# Patient Record
Sex: FEMALE | Race: WHITE | HISPANIC OR LATINO | Employment: UNEMPLOYED | URBAN - METROPOLITAN AREA
[De-identification: names, ages, dates, MRNs, and addresses within clinical notes are randomized per-mention and may not be internally consistent; named-entity substitution may affect disease eponyms.]

---

## 2020-03-02 ENCOUNTER — INITIAL PRENATAL (OUTPATIENT)
Dept: OBGYN | Facility: CLINIC | Age: 20
End: 2020-03-02

## 2020-03-02 VITALS
WEIGHT: 142 LBS | BODY MASS INDEX: 26.81 KG/M2 | SYSTOLIC BLOOD PRESSURE: 114 MMHG | HEIGHT: 61 IN | DIASTOLIC BLOOD PRESSURE: 66 MMHG

## 2020-03-02 DIAGNOSIS — N93.8 DUB (DYSFUNCTIONAL UTERINE BLEEDING): ICD-10-CM

## 2020-03-02 DIAGNOSIS — Z32.01 POSITIVE PREGNANCY TEST: ICD-10-CM

## 2020-03-02 LAB
INT CON NEG: NEGATIVE
INT CON POS: POSITIVE
POC URINE PREGNANCY TEST: POSITIVE

## 2020-03-02 PROCEDURE — 81025 URINE PREGNANCY TEST: CPT | Performed by: OBSTETRICS & GYNECOLOGY

## 2020-03-02 PROCEDURE — 99203 OFFICE O/P NEW LOW 30 MIN: CPT | Mod: 25 | Performed by: OBSTETRICS & GYNECOLOGY

## 2020-03-02 PROCEDURE — 76830 TRANSVAGINAL US NON-OB: CPT | Performed by: OBSTETRICS & GYNECOLOGY

## 2020-03-02 SDOH — HEALTH STABILITY: MENTAL HEALTH: HOW OFTEN DO YOU HAVE A DRINK CONTAINING ALCOHOL?: NEVER

## 2020-03-02 NOTE — PROGRESS NOTES
"Subjective:      Gloria Smith is a 19 y.o. female who presents with amenorrhea and positive home pregnancy test            HPI patient is a 19-year-old -0-0-0 who presents today with amenorrhea and positive home pregnancy test.  By last menstrual period,  Patient is supposed to be 15 weeks and 3 days gestation.  Pregnancy was desired and patient was not using contraception.  She is taking prenatal vitamins.  Reports some mild nausea without emesis.  Denies any pain or bleeding.  Reports normal bowel and bladder functions.    Patient reports 1 vaginal delivery in [place] in 2018 and states baby  about 10 days after birth from some form of bowel infection    ROS all organ systems were reviewed and were negative except for complaints in HPI       Objective:     /66   Ht 1.54 m (5' 0.63\")   Wt 64.4 kg (142 lb)   LMP 11/15/2019   BMI 27.16 kg/m²      Physical Exam  Vitals signs and nursing note reviewed. Exam conducted with a chaperone present.   Constitutional:       Appearance: Normal appearance. She is normal weight.   HENT:      Head: Normocephalic and atraumatic.   Eyes:      General: No scleral icterus.        Right eye: No discharge.         Left eye: No discharge.      Extraocular Movements: Extraocular movements intact.      Pupils: Pupils are equal, round, and reactive to light.   Neck:      Musculoskeletal: Normal range of motion and neck supple. No neck rigidity.   Cardiovascular:      Rate and Rhythm: Normal rate and regular rhythm.      Pulses: Normal pulses.      Heart sounds: Normal heart sounds.   Pulmonary:      Effort: Pulmonary effort is normal. No respiratory distress.      Breath sounds: Normal breath sounds. No wheezing.   Abdominal:      General: Abdomen is flat. Bowel sounds are normal.      Palpations: Abdomen is soft.      Tenderness: There is no abdominal tenderness.   Genitourinary:     General: Normal vulva.      Vagina: No vaginal discharge. "   Musculoskeletal: Normal range of motion.      Right lower leg: No edema.      Left lower leg: No edema.   Lymphadenopathy:      Cervical: No cervical adenopathy.   Skin:     General: Skin is warm and dry.      Coloration: Skin is not jaundiced.      Findings: No bruising.   Neurological:      General: No focal deficit present.      Mental Status: She is alert and oriented to person, place, and time.      Gait: Gait normal.   Psychiatric:         Mood and Affect: Mood normal.         Behavior: Behavior normal.         Thought Content: Thought content normal.         Judgment: Judgment normal.            Transabdominal and transvaginal ultrasound was performed and read by me:  Patient reports she is about 15 weeks pregnant but on transabdominal ultrasound, limited visualization of the uterus was obtained and transvaginal ultrasound was then performed.    Brar intrauterine pregnancy noted  Fetal heart rate was in the 170s  Crown-rump length measurement gives a gestational age of 8 weeks and 3 days  EDC by CRL is 10/9/2020  Gestational age by LMP is 15 weeks and 3 days with EDC of 8/21/2020  No adnexal masses noted  No pelvic free fluid noted    Impression: Normal intrauterine pregnancy at 8 weeks and 3 days gestation with final EDC of 10/9/2020     Assessment/Plan:       1.  Secondary amenorrhea  19-year-old G2, P0 presented with amenorrhea and positive home pregnancy test.  Normal intrauterine pregnancy confirmed today at 8 weeks and 3 days gestation.  Findings reviewed  - POCT Pregnancy  Pregnancy precautions and restrictions reviewed    2. Positive pregnancy test    3.  Nausea in pregnancy.  Treatment options discussed including dietary modification and over-the-counter treatment.  Instructions provided    4.  Precautions and plan of care reviewed.  Patient to follow-up around 10 weeks gestation for new OB exam

## 2020-03-02 NOTE — PROGRESS NOTES
Pt. Here for  visit today.  # [phone]  Pt. States no complaints   Pharmacy verified  Chaperone offered and not indicated

## 2020-03-04 ENCOUNTER — APPOINTMENT (OUTPATIENT)
Dept: OBGYN | Facility: CLINIC | Age: 20
End: 2020-03-04

## 2020-03-27 ENCOUNTER — HOSPITAL ENCOUNTER (OUTPATIENT)
Dept: LAB | Facility: MEDICAL CENTER | Age: 20
End: 2020-03-27
Attending: ADVANCED PRACTICE MIDWIFE
Payer: COMMERCIAL

## 2020-03-27 ENCOUNTER — HOSPITAL ENCOUNTER (OUTPATIENT)
Facility: MEDICAL CENTER | Age: 20
End: 2020-03-27
Attending: ADVANCED PRACTICE MIDWIFE
Payer: COMMERCIAL

## 2020-03-27 ENCOUNTER — INITIAL PRENATAL (OUTPATIENT)
Dept: OBGYN | Facility: CLINIC | Age: 20
End: 2020-03-27

## 2020-03-27 VITALS
HEART RATE: 68 BPM | WEIGHT: 142 LBS | BODY MASS INDEX: 27.16 KG/M2 | SYSTOLIC BLOOD PRESSURE: 106 MMHG | DIASTOLIC BLOOD PRESSURE: 60 MMHG

## 2020-03-27 DIAGNOSIS — Z34.81 ENCOUNTER FOR SUPERVISION OF OTHER NORMAL PREGNANCY IN FIRST TRIMESTER: ICD-10-CM

## 2020-03-27 LAB
ABO GROUP BLD: NORMAL
AMORPH CRY #/AREA URNS HPF: PRESENT /HPF
APPEARANCE UR: ABNORMAL
BACTERIA #/AREA URNS HPF: ABNORMAL /HPF
BASOPHILS # BLD AUTO: 0.5 % (ref 0–1.8)
BASOPHILS # BLD: 0.05 K/UL (ref 0–0.12)
BILIRUB UR QL STRIP.AUTO: NEGATIVE
BLD GP AB SCN SERPL QL: NORMAL
COLOR UR: YELLOW
EOSINOPHIL # BLD AUTO: 0.07 K/UL (ref 0–0.51)
EOSINOPHIL NFR BLD: 0.7 % (ref 0–6.9)
EPI CELLS #/AREA URNS HPF: NEGATIVE /HPF
ERYTHROCYTE [DISTWIDTH] IN BLOOD BY AUTOMATED COUNT: 43.6 FL (ref 35.9–50)
GLUCOSE UR STRIP.AUTO-MCNC: NEGATIVE MG/DL
HBV SURFACE AG SER QL: ABNORMAL
HCT VFR BLD AUTO: 40.5 % (ref 37–47)
HCV AB SER QL: NORMAL
HGB BLD-MCNC: 13.9 G/DL (ref 12–16)
HIV 1+2 AB+HIV1 P24 AG SERPL QL IA: NORMAL
HYALINE CASTS #/AREA URNS LPF: ABNORMAL /LPF
IMM GRANULOCYTES # BLD AUTO: 0.06 K/UL (ref 0–0.11)
IMM GRANULOCYTES NFR BLD AUTO: 0.6 % (ref 0–0.9)
KETONES UR STRIP.AUTO-MCNC: NEGATIVE MG/DL
LEUKOCYTE ESTERASE UR QL STRIP.AUTO: NEGATIVE
LYMPHOCYTES # BLD AUTO: 1.81 K/UL (ref 1–4.8)
LYMPHOCYTES NFR BLD: 17.7 % (ref 22–41)
MCH RBC QN AUTO: 32.3 PG (ref 27–33)
MCHC RBC AUTO-ENTMCNC: 34.3 G/DL (ref 33.6–35)
MCV RBC AUTO: 94.2 FL (ref 81.4–97.8)
MICRO URNS: ABNORMAL
MONOCYTES # BLD AUTO: 0.74 K/UL (ref 0–0.85)
MONOCYTES NFR BLD AUTO: 7.2 % (ref 0–13.4)
NEUTROPHILS # BLD AUTO: 7.5 K/UL (ref 2–7.15)
NEUTROPHILS NFR BLD: 73.3 % (ref 44–72)
NITRITE UR QL STRIP.AUTO: NEGATIVE
NRBC # BLD AUTO: 0 K/UL
NRBC BLD-RTO: 0 /100 WBC
PH UR STRIP.AUTO: 8 [PH] (ref 5–8)
PLATELET # BLD AUTO: 187 K/UL (ref 164–446)
PMV BLD AUTO: 10.2 FL (ref 9–12.9)
PROT UR QL STRIP: NEGATIVE MG/DL
RBC # BLD AUTO: 4.3 M/UL (ref 4.2–5.4)
RBC # URNS HPF: ABNORMAL /HPF
RBC UR QL AUTO: NEGATIVE
RH BLD: NORMAL
RUBV AB SER QL: 254 IU/ML
SP GR UR STRIP.AUTO: 1.02
TREPONEMA PALLIDUM IGG+IGM AB [PRESENCE] IN SERUM OR PLASMA BY IMMUNOASSAY: ABNORMAL
UROBILINOGEN UR STRIP.AUTO-MCNC: 0.2 MG/DL
WBC # BLD AUTO: 10.2 K/UL (ref 4.8–10.8)
WBC #/AREA URNS HPF: ABNORMAL /HPF

## 2020-03-27 PROCEDURE — 8199 PREG CTR HIGH RISK PKG RATE (SYSTEM): Performed by: ADVANCED PRACTICE MIDWIFE

## 2020-03-27 PROCEDURE — 59402 PR NEW OB HIGH RISK: CPT | Performed by: ADVANCED PRACTICE MIDWIFE

## 2020-03-27 NOTE — LETTER
Estudios Para Detectar los Portadores de la Fibrosis Quística   (La Enfermedad Fibroquística del Páncreas)    Gloria Sequeirato Luis    Esta información esta relacionada con un examen de jazmin que puede determinar si usted o win theresa es portador del gen que causa la enfermedad hereditaria que se llama la fibrosis quística.     ¿QUE ES LA ENFERMEDAD FIBROSIS QUÍSTICA?  · La  fibrosis quística es leno enfermedad hereditaria que afecta a más de 25,000 niños y jóvenes adultos americanos.  · Los síntomas de la fibrosis quística varían de leno persona a otra.  Los síntomas más comunes son congestión pulmonar, diarrea y retraso en el crecimiento del jennifer.  La mayoría de las personas con la fibrosis quística padecen de problemas médicos muy severos, y algunas mueren jóvenes.  Otras tienen tan pocos síntomas que no se percatan de que tienen fibrosis quística.  · La fibrosis quística no afecta en absoluto la inteligencia de la persona.  · Aunque actualmente no hay leno francisco j para la fibrosis quística, los científicos están avanzando con respecto a nuevos tratamientos y en la búsqueda de la francisco j.  Anteriormente la mayoría de las personas que padecían de fibrosis quística morían muy jóvenes.  Hoy en día, muchas personas que padecen de la fibrosis quística sobreviven hasta los 20 o 30 anos de edad.    ¿EXISTE LA POSIBILIDAD DE QUE MI LAQUITA PUEDA TENER FIBROSIS QUÍSTICA?  · Usted puede tener un hijo con la fibrosis quística aun cuando no hay nadie en win irving que la padezca.  (Ines la grafica que sigue.)  · Existe leno prueba que puede ayudarle a determinar si will un gen para la fibrosis quística y si por eso corre un riesgo de tener un hijo con la enfermedad.  · Si ambos padres son portadores del gen para la fibrosis quística, hay leno (1) probabilidad entre 4 (25%) en cada embarazo, de que puedan tener un hijo afectada con fibrosis quística.  · Los portadores del gen para la fibrosis quística tienen leno copia del  gen normal y el otro gen alterado.  · Las personas con fibrosis quística tienen dos genes alterados para la fibrosis quística,  · Normalmente la mayoría de individuos tienen dos copias normales del gen para fibrosis quística.    Riesgo aproximado de que leno theresa sin familiares con fibrosis quística pueda tener un hijo con fibrosis quística:  Origen / Riesgo  Leno theresa Caucásica (de karoline manju):  1 en 2,500  Leno theresa :  1 en 8,000  Leno theresa Afroamericana (de karoline sharad):  1 en 15,000  Leno theresa Asiática:  1 en 32,000    ¿EXISTEN PRUEBAS PARA DETECTAR LOS PORTADORES DE LA FIBROSIS QUÍSTICA?  · Hay leno prueba de jazmin para determinar si usted o win theresa portan el gen para la fibrosis quística.  · Es importante entender que la prueba no detecta todos los portadores del gen para la fibrosis quística.  · Si la prueba determina que ambos padres con portadores, se puede realizar otras pruebas para determinar si win futuro vero esta afectado.    ¿CUANTO DIANNA LA PRUEBA PARA DETERMINAR SI SOY PORTADOR(A) DEL GEN PARA LA FIBROSIS QUÍSTICA?  · El costo de la prueba varia según win póliza de seguro medico y el laboratorio donde se efectúa el análisis.  · El costo promedio de la prueba es de $300.  · Win consejera genética puede darle mas información acera de esta prueba para determinar si usted es portador de la fibrosis quística.    _____  Si, yo estoy interesada y me gustaria obtener mas información al respecto.  _____  No tengo interés ni en hacerme la prueba para determinar si soy portador(a) de gen para la fibrosis quística ni en recibir mas información al respecto.    Firma del paciente: _____________________________   3/27/2020

## 2020-03-27 NOTE — PROGRESS NOTES
Subjective:   Gloria Smith is a 19 y.o.  who presents for her new OB exam.  She is 12w0d with an ASTRID of Estimated Date of Delivery: 10/9/20 by US. She is feeling well and has no concerns at this time. Denies VB, LOF, contractions or pain. She reports no ER visits or previous care in this pregnancy. Denies dysuria, vaginal DC, fever. Reports absent fetal movement. Unsure AFP.  Declines CF.      History reviewed. No pertinent past medical history.    Psych Hx: Patient denies any history of depression, anxiety, PTSD, bipolar or any other psychological issues.     History reviewed. No pertinent surgical history.     OB History    Para Term  AB Living   2 1 1         SAB TAB Ectopic Molar Multiple Live Births             1      # Outcome Date GA Lbr Suresh/2nd Weight Sex Delivery Anes PTL Lv   2 Current            1 Term 18 40w0d   M Vag-Spont EPI N DEC      Birth Comments:  at 10 days old; NEC?        Gynecological Hx: Denies any hx of STIs, including HSV. Denies any vulvovaginal disorders and no hx of abnormal cervical cytology. Last pap . No records    Sexual Hx: One current male partner, who is  FOB     Family History   Problem Relation Age of Onset   • No Known Problems Mother    • Diabetes Father    • No Known Problems Sister    • No Known Problems Brother    • No Known Problems Sister      Denies any genetic disorders in family history.     Social History     Socioeconomic History   • Marital status: Single     Spouse name: Not on file   • Number of children: Not on file   • Years of education: Not on file   • Highest education level: Not on file   Occupational History   • Not on file   Social Needs   • Financial resource strain: Not on file   • Food insecurity     Worry: Not on file     Inability: Not on file   • Transportation needs     Medical: Not on file     Non-medical: Not on file   Tobacco Use   • Smoking status: Never Smoker   • Smokeless tobacco: Never  Used   Substance and Sexual Activity   • Alcohol use: Never     Frequency: Never   • Drug use: Never   • Sexual activity: Yes     Partners: Male     Comment: not . Planned pregnancy   Lifestyle   • Physical activity     Days per week: Not on file     Minutes per session: Not on file   • Stress: Not on file   Relationships   • Social connections     Talks on phone: Not on file     Gets together: Not on file     Attends Taoism service: Not on file     Active member of club or organization: Not on file     Attends meetings of clubs or organizations: Not on file     Relationship status: Not on file   • Intimate partner violence     Fear of current or ex partner: Not on file     Emotionally abused: Not on file     Physically abused: Not on file     Forced sexual activity: Not on file   Other Topics Concern   • Not on file   Social History Narrative   • Not on file       FOB (Duran) and lives with Gloria Smith.  Pregnancy is  planned is desired.    She is currently not working  , denies any heavy lifting or exposure to potential teratogens like environmental or occupational toxins.   Denies alcohol use, drug use, or tobacco use in pregnancy.   Denies any current or hx of sexual, emotional or physical abuse or trauma.     Current Medications: PNV  Allergies: Denies allergies to medications, food, or environmental allergies    Objective:      Vitals:    03/27/20 0755   BP: 106/60   Pulse: 68   Weight: 64.4 kg (142 lb)        See Prenatal Physical and Prenatal Vitals  UA WNL today      Assessment:      1.  IUP @ 12w0d per US      2.  S=D      3.  See problem list as follows     There are no active problems to display for this patient.        Plan:   -  GC/CT & pap done today   - Prenatal labs ordered - lab slip provided  - Discussed PNV, nutrition, adequate water intake, and exercise/weight gain in pregnancy  - NOB informational packet with anticipatory guidance given  - Reviewed Centering  Pregnancy and patient is not candidate.   - S/sx of pregnancy warning signs and PTL precautions given  - Complete OB US in 8 wks  - Return to clinic in 4 weeks.

## 2020-03-27 NOTE — PROGRESS NOTES
NOB visit  LMP: Unknown   WT: 142 lb  BP: 106/60  Pt states no complaints or concerns today  Good # [phone]

## 2020-03-28 LAB
AMPHET CTO UR CFM-MCNC: NEGATIVE NG/ML
BARBITURATES CTO UR CFM-MCNC: NEGATIVE NG/ML
BENZODIAZ CTO UR CFM-MCNC: NEGATIVE NG/ML
CANNABINOIDS CTO UR CFM-MCNC: NEGATIVE NG/ML
COCAINE CTO UR CFM-MCNC: NEGATIVE NG/ML
DRUG COMMENT 753798: NORMAL
METHADONE CTO UR CFM-MCNC: NEGATIVE NG/ML
OPIATES CTO UR CFM-MCNC: NEGATIVE NG/ML
PCP CTO UR CFM-MCNC: NEGATIVE NG/ML
PROPOXYPH CTO UR CFM-MCNC: NEGATIVE NG/ML

## 2020-04-01 PROBLEM — R87.612 PAPANICOLAOU SMEAR OF CERVIX WITH LOW GRADE SQUAMOUS INTRAEPITHELIAL LESION (LGSIL): Status: ACTIVE | Noted: 2020-04-01

## 2020-04-01 LAB
C TRACH DNA GENITAL QL NAA+PROBE: NEGATIVE
CYTOLOGY REG CYTOL: NORMAL
N GONORRHOEA DNA GENITAL QL NAA+PROBE: NEGATIVE
SPECIMEN SOURCE: NORMAL

## 2020-04-24 ENCOUNTER — ROUTINE PRENATAL (OUTPATIENT)
Dept: OBGYN | Facility: CLINIC | Age: 20
End: 2020-04-24

## 2020-04-24 VITALS — SYSTOLIC BLOOD PRESSURE: 122 MMHG | WEIGHT: 145 LBS | DIASTOLIC BLOOD PRESSURE: 74 MMHG | BODY MASS INDEX: 27.73 KG/M2

## 2020-04-24 DIAGNOSIS — Z87.59 HISTORY OF NEONATAL DEATH: ICD-10-CM

## 2020-04-24 PROCEDURE — 90040 PR PRENATAL FOLLOW UP: CPT | Performed by: NURSE PRACTITIONER

## 2020-04-24 NOTE — PROGRESS NOTES
SUBJECTIVE:  Pt is a 19 y.o.   at 16w0d  gestation. Presents today for follow-up prenatal care. Reports no issues at this time.  Reports no fetal movement. Denies regular cramping/contractions, bleeding or leaking of fluid. Denies dysuria, headaches, N/V. Generally feels well today.       OBJECTIVE:  - See prenatal vitals flow  -   Vitals:    20 0806   BP: 122/74   Weight: 65.8 kg (145 lb)                 ASSESSMENT:   - IUP at 16w0d    - S=D   - BSUS shows positive fetal movement, intrauterine pregnancy, FHTs 150s  Patient Active Problem List    Diagnosis Date Noted   • History of  death 2020   • Papanicolaou smear of cervix with low grade squamous intraepithelial lesion (LGSIL) 2020         PLAN:  - S/sx pregnancy and labor warning signs vs general discomforts discussed  - Fetal movements and/or kick counts reviewed   - Adequate hydration reinforced  - Nutrition/exercise/vitamin education; continue PNV  - Declines AFP  - US scheduled  - Reviewed need for repap in one year due to LGSIL  - Anticipatory guidance given  - RTC in 6 weeks for follow-up prenatal care

## 2020-04-24 NOTE — PROGRESS NOTES
Pt here today for OB follow up  Reports no issues at this moment  Reports - FM  Good # [phone]  Pharmacy Confirmed. W/ pt  Declines AFP    05/22/20

## 2020-05-22 ENCOUNTER — APPOINTMENT (OUTPATIENT)
Dept: RADIOLOGY | Facility: IMAGING CENTER | Age: 20
End: 2020-05-22
Attending: ADVANCED PRACTICE MIDWIFE

## 2020-05-22 DIAGNOSIS — Z34.81 ENCOUNTER FOR SUPERVISION OF OTHER NORMAL PREGNANCY IN FIRST TRIMESTER: ICD-10-CM

## 2020-05-22 PROCEDURE — 76805 OB US >/= 14 WKS SNGL FETUS: CPT | Mod: TC | Performed by: OBSTETRICS & GYNECOLOGY

## 2020-06-05 ENCOUNTER — ROUTINE PRENATAL (OUTPATIENT)
Dept: OBGYN | Facility: CLINIC | Age: 20
End: 2020-06-05

## 2020-06-05 VITALS — BODY MASS INDEX: 28.38 KG/M2 | WEIGHT: 148.4 LBS | DIASTOLIC BLOOD PRESSURE: 64 MMHG | SYSTOLIC BLOOD PRESSURE: 116 MMHG

## 2020-06-05 DIAGNOSIS — O09.899 SUPERVISION OF OTHER HIGH RISK PREGNANCY, ANTEPARTUM: Primary | ICD-10-CM

## 2020-06-05 PROCEDURE — 90040 PR PRENATAL FOLLOW UP: CPT | Performed by: NURSE PRACTITIONER

## 2020-06-05 NOTE — PROGRESS NOTES
Pt. Here for OB/FU. Reports Good FM.   Good # [phone]  Pt. Denies VB, LOF, or UC's.   Pharmacy verified.   Chaperone offered and not indicated  Pt states having some discharge, it is sometimes white, denies an odor

## 2020-06-05 NOTE — PROGRESS NOTES
S) Pt is a 19 y.o.   at 22w0d  gestation. Routine prenatal care today. No complaints at this time. Reviewed lab and US results. All questions answered.  labor precautions reviewed.    Fetal movement Normal  Cramping no  VB no  LOF no   Denies dysuria. Generally feels well today. Good self-care activities identified. Denies headaches, swelling, visual changes, or epigastric pain .     O) /64   Wt 67.3 kg (148 lb 6.4 oz)         Labs:       PNL: WNL       GCT: Too early        AFP: Not Examined       GBS: N/A       Pertinent ultrasound -        20- Survey WNL, BENSON 13.12cm, c/w prev dating.    A) IUP at 22w0d       S=D         Patient Active Problem List    Diagnosis Date Noted   • Supervision of other high risk pregnancy, antepartum 2020   • History of  death 2020   • Papanicolaou smear of cervix with low grade squamous intraepithelial lesion (LGSIL) 2020          SVE: deferred       Chaperone offered: n/a         TDAP: no       FLU: no        BTL: no       : n/a       C/S Consent: n/a       IOL or C/S scheduled: no       LAST PAP: 3/27/20- LGSIL- needs repeat in 1 year         P) s/s ptl vs general discomforts. Fetal movements reviewed. General ed and anticipatory guidance. Nutrition/exercise/vitamin. Plans breast Plans pp contraception- unsure  Continue PNV.

## 2020-07-02 ENCOUNTER — HOSPITAL ENCOUNTER (OUTPATIENT)
Dept: LAB | Facility: MEDICAL CENTER | Age: 20
End: 2020-07-02
Attending: NURSE PRACTITIONER
Payer: COMMERCIAL

## 2020-07-02 ENCOUNTER — ROUTINE PRENATAL (OUTPATIENT)
Dept: OBGYN | Facility: CLINIC | Age: 20
End: 2020-07-02

## 2020-07-02 VITALS — WEIGHT: 154 LBS | SYSTOLIC BLOOD PRESSURE: 102 MMHG | BODY MASS INDEX: 29.45 KG/M2 | DIASTOLIC BLOOD PRESSURE: 64 MMHG

## 2020-07-02 DIAGNOSIS — O09.899 SUPERVISION OF OTHER HIGH RISK PREGNANCY, ANTEPARTUM: Primary | ICD-10-CM

## 2020-07-02 DIAGNOSIS — O09.899 SUPERVISION OF OTHER HIGH RISK PREGNANCY, ANTEPARTUM: ICD-10-CM

## 2020-07-02 LAB
BASOPHILS # BLD AUTO: 0.5 % (ref 0–1.8)
BASOPHILS # BLD: 0.06 K/UL (ref 0–0.12)
EOSINOPHIL # BLD AUTO: 0.05 K/UL (ref 0–0.51)
EOSINOPHIL NFR BLD: 0.4 % (ref 0–6.9)
ERYTHROCYTE [DISTWIDTH] IN BLOOD BY AUTOMATED COUNT: 49.2 FL (ref 35.9–50)
GLUCOSE 1H P 50 G GLC PO SERPL-MCNC: 186 MG/DL (ref 70–139)
HCT VFR BLD AUTO: 40.5 % (ref 37–47)
HGB BLD-MCNC: 13.4 G/DL (ref 12–16)
IMM GRANULOCYTES # BLD AUTO: 0.19 K/UL (ref 0–0.11)
IMM GRANULOCYTES NFR BLD AUTO: 1.7 % (ref 0–0.9)
LYMPHOCYTES # BLD AUTO: 1.65 K/UL (ref 1–4.8)
LYMPHOCYTES NFR BLD: 14.4 % (ref 22–41)
MCH RBC QN AUTO: 32.6 PG (ref 27–33)
MCHC RBC AUTO-ENTMCNC: 33.1 G/DL (ref 33.6–35)
MCV RBC AUTO: 98.5 FL (ref 81.4–97.8)
MONOCYTES # BLD AUTO: 0.59 K/UL (ref 0–0.85)
MONOCYTES NFR BLD AUTO: 5.1 % (ref 0–13.4)
NEUTROPHILS # BLD AUTO: 8.95 K/UL (ref 2–7.15)
NEUTROPHILS NFR BLD: 77.9 % (ref 44–72)
NRBC # BLD AUTO: 0 K/UL
NRBC BLD-RTO: 0 /100 WBC
PLATELET # BLD AUTO: 175 K/UL (ref 164–446)
PMV BLD AUTO: 10.1 FL (ref 9–12.9)
RBC # BLD AUTO: 4.11 M/UL (ref 4.2–5.4)
TREPONEMA PALLIDUM IGG+IGM AB [PRESENCE] IN SERUM OR PLASMA BY IMMUNOASSAY: NORMAL
WBC # BLD AUTO: 11.5 K/UL (ref 4.8–10.8)

## 2020-07-02 PROCEDURE — 90040 PR PRENATAL FOLLOW UP: CPT | Performed by: NURSE PRACTITIONER

## 2020-07-02 NOTE — PROGRESS NOTES
S) Pt is a 19 y.o.   at 25w6d  gestation. Routine prenatal care today. Feeling well today. Reports feeling the baby move. Denies LOF, Bleeding, and cramping. Drinking 3 L of water/ day and getting adequate nutrition. Getting 6-7 hours of sleep/night.  Denies headaches, swelling, visual changes, or epigastric pain .     O) See flow sheet for vital signs and fetal measurements.          Labs:       PNL: WNL       GCT: ordered today        AFP: Not Examined       GBS: N/A       Pertinent ultrasound - 20- Anatomy  WNL,, c/w prev dating            A) IUP at 25w6d       S=D         Patient Active Problem List    Diagnosis Date Noted   • Supervision of other high risk pregnancy, antepartum 2020   • History of  death 2020   • Papanicolaou smear of cervix with low grade squamous intraepithelial lesion (LGSIL) 2020          SVE: Not indicated           TDAP: no       FLU: no        BTL: no       : no       C/S Consent: no       IOL or C/S scheduled: no       LAST PAP: 2020- LSIL - repeat 1 year          P)   [id number] used   Req given with instructions for GCT, CBC, & RPR   s/s ptl vs general discomforts.   Fetal movements reviewed.   General ed and anticipatory guidance.   Nutrition/exercise/vitamin  Continue PNV.   RTO 3 weeks with GCT done

## 2020-07-04 DIAGNOSIS — R73.09 ELEVATED GLUCOSE TOLERANCE TEST: ICD-10-CM

## 2020-07-23 ENCOUNTER — ROUTINE PRENATAL (OUTPATIENT)
Dept: OBGYN | Facility: CLINIC | Age: 20
End: 2020-07-23

## 2020-07-23 VITALS — BODY MASS INDEX: 30.14 KG/M2 | DIASTOLIC BLOOD PRESSURE: 70 MMHG | SYSTOLIC BLOOD PRESSURE: 110 MMHG | WEIGHT: 157.6 LBS

## 2020-07-23 DIAGNOSIS — O09.899 SUPERVISION OF OTHER HIGH RISK PREGNANCY, ANTEPARTUM: ICD-10-CM

## 2020-07-23 PROCEDURE — 90715 TDAP VACCINE 7 YRS/> IM: CPT | Performed by: PHYSICIAN ASSISTANT

## 2020-07-23 PROCEDURE — 90471 IMMUNIZATION ADMIN: CPT | Performed by: PHYSICIAN ASSISTANT

## 2020-07-23 PROCEDURE — 90040 PR PRENATAL FOLLOW UP: CPT | Performed by: PHYSICIAN ASSISTANT

## 2020-07-23 NOTE — PROGRESS NOTES
Pt has no complaints with cramping, UCs, VB, LOF, though pt has had incr pressure and occ incr vag d/c only. Pt denies vag itching, burning or odor. +FM - FKC sheet given today. 1hr  - pt notified of results and need for 3hr gTT asap. CBC, T pallidium wnl - pt notified of results. Declines BTL if C/S needed. TDap given today. PTL precautions stressed. Unable to hear FHT, so BSUS done today with breech position noted, FHT 137bpm, +FM. RTC 2 wk ro sooner prn.

## 2020-07-23 NOTE — LETTER
Cuente los Movimientos de win Bebé  Otro paso importante para la marco a de win bebé    Gloria Cuellogail Rigo Smith     [facility]            Dept: [phone]    ¿Cuántas semanas tiene de embarazo? 28w6d    Fecha cuando tiene que comenzar a contar el movimiento: 07/23/2020                  Lo debe usar tyson diagrama    Leno manera en que win doctor puede controlar a marco a de win bebé es sabiendo cuantas veces se mueve win bebé en el útero, o por medio de las “pataditas”.  Usted podrá ayudarle a win médico al usar cada día el siguiente diagrama.    Cada día, usted debe prestar atención a cuantas horas le lleva a win bebé moverse 10 veces.  Comience a contar en la mañana, lo antes posible después de haberse levantado.    · Primeramente, escriba la hora en que se mueve win bebé, hasta llegar a 10 veces.  · Colóquele un check o palomita a cada cuadrito cada vez que win bebé se mueva hasta que complete 10 veces.  · Escriba la hora cuando termine de contar 10 veces en la última columna.  · Sume el total del tiempo que le llevó contar los 10 movimientos.  · Finalmente, complete el cuadrito de cuantas horas le llevó hacerlo.    Después de edson contado los 10 movimientos, ya no tendrá que contar los demás movimientos por el loly del día.  A la mañana siguiente, comience a contar de nuevo cuantas veces se mueve el bebé desde el momento en que se levante.    ¿Qué tendría que considerarse un “movimiento”?  Es difícil de decirlo porque es distinto de leno madre a otra, y de un embarazo a otro.  Lo importante es que cuente el movimiento de la misma manera teddy el transcurso de win embarazo.  Si tiene preguntas adicionales, pregúntele a win doctor.    ¡Cuente cuidadosamente cada día!     MUESTRA:  Semana 28    ¿Cuántas horas le ha llevado sentir 10 movimientos?        Hora de Inicio     1     2     3     4     5     6     7     8     9     10   Hora de Finlizar   Ralph. 8:20 ·  ·  ·  ·  ·  ·  ·  ·  ·  ·   11:40   Mar.               Mié.               Leesa.               Clarissae.               Sáb.               Dom.                 IMPORTANTE:  Usted debe contactar a win doctor si le lleva más de 2 horas sentir 10 movimientos de win bebé.    Cada mañana, escriba la hora de inicio y comience a contar los movimientos de win bebé.  Hágalo colocándole un check o palomita a cada cuadrito cada vez que sienta un movimiento de win bebé.  Cuando haya sentido 10 “pataditas”, escriba la hora en que terminó de contar en la última columna.  Luego, complete en la cajita (arriba de la la de check o palomita) el número total de horas que le llevó hacerlo.  Asegúrese de leer completamente las instrucciones en la página anterior.

## 2020-07-23 NOTE — PROGRESS NOTES
OB follow up   + fetal movement. Active   No VB, LOF or UC's.  Wt: 157.6LBS       BP: 110/70  Phone #  [phone]  Preferred pharmacy confirmed.  No complaints as of today   TDAP Today  BTL Declined   Kick count sheet given today.    NDC: 79336-840-15  LOT#: [id number]  Expiration Date: 2020  Dose: 0.5mL  Site: Right Deltoid  Patient educated on use and side effects of medication. Name and  verified prior to injection. Pt tolerated? Well   Verified by Tamsen  Administered by Virgie Arce, Med Ass't at 8:15 AM.  Patient Provided Medication: no

## 2020-08-03 ENCOUNTER — HOSPITAL ENCOUNTER (OUTPATIENT)
Dept: LAB | Facility: MEDICAL CENTER | Age: 20
End: 2020-08-03
Attending: NURSE PRACTITIONER
Payer: COMMERCIAL

## 2020-08-03 DIAGNOSIS — R73.09 ELEVATED GLUCOSE TOLERANCE TEST: ICD-10-CM

## 2020-08-03 LAB
GLUCOSE 1H P CHAL SERPL-MCNC: 178 MG/DL (ref 65–180)
GLUCOSE 2H P CHAL SERPL-MCNC: 153 MG/DL (ref 65–155)
GLUCOSE 3H P CHAL SERPL-MCNC: 122 MG/DL (ref 65–140)
GLUCOSE BS SERPL-MCNC: 81 MG/DL (ref 65–95)

## 2020-08-06 ENCOUNTER — ROUTINE PRENATAL (OUTPATIENT)
Dept: OBGYN | Facility: CLINIC | Age: 20
End: 2020-08-06

## 2020-08-06 VITALS — BODY MASS INDEX: 30.35 KG/M2 | SYSTOLIC BLOOD PRESSURE: 110 MMHG | DIASTOLIC BLOOD PRESSURE: 70 MMHG | WEIGHT: 158.7 LBS

## 2020-08-06 DIAGNOSIS — O09.899 SUPERVISION OF OTHER HIGH RISK PREGNANCY, ANTEPARTUM: Primary | ICD-10-CM

## 2020-08-06 PROCEDURE — 90040 PR PRENATAL FOLLOW UP: CPT | Performed by: NURSE PRACTITIONER

## 2020-08-06 NOTE — PROGRESS NOTES
OB follow up   + fetal movement.  No VB, LOF or UC's.  Wt:158.7 lbs      BP: 110/70  Phone # [phone]  Preferred pharmacy confirmed. yes

## 2020-08-06 NOTE — PROGRESS NOTES
S) Pt is a 19 y.o.   at 30w6d  gestation. Routine prenatal care today. No concerns today. Lab results reviewed, all questions answered. Discussed GBS at 36 weeks.  labor precautions reviewed.    Fetal movement Normal  Cramping no  VB no  LOF no   Denies dysuria. Generally feels well today. Good self-care activities identified. Denies headaches, swelling, visual changes, or epigastric pain .     O) /70         Labs:       PNL: WNL       GCT: 186, but 3 hour WNL        AFP: Not Examined       GBS: N/A       Pertinent ultrasound -        20- Survey WNL, BENSON 13.12cm, c/w prev dating.    A) IUP at 30w6d       S=D         Patient Active Problem List    Diagnosis Date Noted   • Elevated GTT, passed 3 hour test 2020   • Supervision of other high risk pregnancy, antepartum 2020   • History of  death 2020   • Papanicolaou smear of cervix with low grade squamous intraepithelial lesion (LGSIL) 2020          SVE: deferred       Chaperone offered: n/a         TDAP: yes       FLU: no        BTL: no       : n/a       C/S Consent: n/a       IOL or C/S scheduled: no       LAST PAP: 3/27/20- LGSIL, repeat pap in 1 year         P) s/s ptl vs general discomforts. Fetal movements reviewed. General ed and anticipatory guidance. Nutrition/exercise/vitamin. Plans breast Plans pp contraception- unsure  Continue PNV.

## 2020-08-20 ENCOUNTER — ROUTINE PRENATAL (OUTPATIENT)
Dept: OBGYN | Facility: CLINIC | Age: 20
End: 2020-08-20

## 2020-08-20 VITALS — WEIGHT: 163 LBS | DIASTOLIC BLOOD PRESSURE: 60 MMHG | BODY MASS INDEX: 31.18 KG/M2 | SYSTOLIC BLOOD PRESSURE: 122 MMHG

## 2020-08-20 DIAGNOSIS — O09.899 SUPERVISION OF OTHER HIGH RISK PREGNANCY, ANTEPARTUM: ICD-10-CM

## 2020-08-20 PROCEDURE — 90040 PR PRENATAL FOLLOW UP: CPT | Performed by: PHYSICIAN ASSISTANT

## 2020-08-20 NOTE — PROGRESS NOTES
Pt has no complaints with cramping, UCs, Vb, LOF, though pt has had incr pressure only. +FM. PTL precautions reviewed. Daily FKC recommended. RTC 2 wk or sooner prn.

## 2020-08-20 NOTE — PROGRESS NOTES
OB follow up   + fetal movement.  yes  No VB, LOF or UC's. no  Wt:163     BP:122/60  Phone # [phone]  Preferred pharmacy confirmed.yes

## 2020-09-03 ENCOUNTER — ROUTINE PRENATAL (OUTPATIENT)
Dept: OBGYN | Facility: CLINIC | Age: 20
End: 2020-09-03

## 2020-09-03 VITALS
BODY MASS INDEX: 31.37 KG/M2 | SYSTOLIC BLOOD PRESSURE: 110 MMHG | DIASTOLIC BLOOD PRESSURE: 64 MMHG | WEIGHT: 164.02 LBS

## 2020-09-03 DIAGNOSIS — Z34.80 SUPERVISION OF OTHER NORMAL PREGNANCY, ANTEPARTUM: ICD-10-CM

## 2020-09-03 PROCEDURE — 90040 PR PRENATAL FOLLOW UP: CPT | Performed by: NURSE PRACTITIONER

## 2020-09-03 NOTE — PROGRESS NOTES
SUBJECTIVE:  Pt is a 19 y.o.   at 34w6d  gestation. Presents today for follow-up prenatal care. Reports no issues at this time.  Reports good  fetal movement. Denies regular cramping/contractions, bleeding or leaking of fluid. Denies dysuria, headaches, N/V. Generally feels well today.      OBJECTIVE:  - See prenatal vitals flow  -   Vitals:    20 0806   BP: 110/64   Weight: 74.4 kg (164 lb 0.4 oz)                 ASSESSMENT:   - IUP at 34w6d    - S=D   -   Patient Active Problem List    Diagnosis Date Noted   • Supervision of other high risk pregnancy, antepartum 2020   • History of  death 2020   • Papanicolaou smear of cervix with low grade squamous intraepithelial lesion (LGSIL) 2020         PLAN:  - S/sx pregnancy and labor warning signs vs general discomforts discussed  - Fetal movements and/or kick counts reviewed   - Adequate hydration reinforced  - Nutrition/exercise/vitamin education; continue PNV  - S/p Tdap vacc   - Anticipatory guidance given  - RTC in 1 weeks for follow-up prenatal care

## 2020-09-03 NOTE — PROGRESS NOTES
Pt here today for OB follow up  Pt states no complaints   Reports +FM  Good # [phone]  Pharmacy Confirmed.  Chaperone offered and not indicated.   3 hr GTT, WNL

## 2020-09-10 ENCOUNTER — HOSPITAL ENCOUNTER (OUTPATIENT)
Facility: MEDICAL CENTER | Age: 20
End: 2020-09-10
Attending: NURSE PRACTITIONER
Payer: COMMERCIAL

## 2020-09-10 ENCOUNTER — ROUTINE PRENATAL (OUTPATIENT)
Dept: OBGYN | Facility: CLINIC | Age: 20
End: 2020-09-10

## 2020-09-10 VITALS — BODY MASS INDEX: 32.13 KG/M2 | SYSTOLIC BLOOD PRESSURE: 108 MMHG | DIASTOLIC BLOOD PRESSURE: 64 MMHG | WEIGHT: 168 LBS

## 2020-09-10 DIAGNOSIS — Z34.80 SUPERVISION OF OTHER NORMAL PREGNANCY, ANTEPARTUM: Primary | ICD-10-CM

## 2020-09-10 DIAGNOSIS — Z34.80 SUPERVISION OF OTHER NORMAL PREGNANCY, ANTEPARTUM: ICD-10-CM

## 2020-09-10 PROCEDURE — 90040 PR PRENATAL FOLLOW UP: CPT | Performed by: NURSE PRACTITIONER

## 2020-09-10 NOTE — NON-PROVIDER
S:  Pt is  at 35w6d for routine OB follow up.  Patent reports no concerns today. No ED or hospital visits since last seen. Reports good FM.  Denies VB, LOF, RUCs or vaginal DC.    O:  Please see above vitals.        FHTs: 130        Fundal ht: 36 cm.        S=D        GBS collected today    A:  IUP at 35w6d  Patient Active Problem List    Diagnosis Date Noted   • Supervision of other normal pregnancy, antepartum 2020   • History of  death 2020   • Papanicolaou smear of cervix with low grade squamous intraepithelial lesion (LGSIL) 2020        P:            1.  Continue FKCs.          2.  Questions answered.           3.  Reviewed  labor precautions, when to seek care.        4.  F/u 1 wk.

## 2020-09-10 NOTE — PROGRESS NOTES
OB follow up   + fetal movement.  No VB, LOF or UC's.  Phone #  [phone]  Preferred pharmacy confirmed.  GBS today

## 2020-09-12 LAB — GP B STREP DNA SPEC QL NAA+PROBE: NEGATIVE

## 2020-09-17 ENCOUNTER — ROUTINE PRENATAL (OUTPATIENT)
Dept: OBGYN | Facility: CLINIC | Age: 20
End: 2020-09-17

## 2020-09-17 VITALS — WEIGHT: 169.4 LBS | SYSTOLIC BLOOD PRESSURE: 100 MMHG | DIASTOLIC BLOOD PRESSURE: 50 MMHG | BODY MASS INDEX: 32.4 KG/M2

## 2020-09-17 DIAGNOSIS — Z34.80 SUPERVISION OF OTHER NORMAL PREGNANCY, ANTEPARTUM: Primary | ICD-10-CM

## 2020-09-17 PROCEDURE — 90040 PR PRENATAL FOLLOW UP: CPT | Performed by: NURSE PRACTITIONER

## 2020-09-17 NOTE — PROGRESS NOTES
S) Pt is a 19 y.o.   at 36w6d  gestation. Routine prenatal care today. Pt without concerns today.  Wants her cervix checked.    Fetal movement Normal  Cramping no  VB no  LOF no   Denies dysuria. Generally feels well today. Good self-care activities identified. Denies headaches, swelling, visual changes, or epigastric pain .     O) See flow sheet for vital signs and fetal measurements.          Labs: WNL       PNL: WNL       GCT: elevated 1 hr, normal 3 hr       AFP: Not Examined       GBS: negative       Pertinent ultrasound - 20- Anatomy  WNL,, c/w prev dating               Vertex by BSUS    A) IUP at 36w6d       S=D         Patient Active Problem List    Diagnosis Date Noted   • Supervision of other normal pregnancy, antepartum 2020   • History of  death 2020   • Papanicolaou smear of cervix with low grade squamous intraepithelial lesion (LGSIL) 2020          SVE: closed (external os 3cm)/40/no presenting part         TDAP: yes       FLU: no        BTL: no       : no       C/S Consent: no       IOL or C/S scheduled: no       LAST PAP: 3/27/20 LGSIL, needs repeat 1 year         P) s/s ptl vs general discomforts. Fetal movements reviewed. General ed and anticipatory guidance. Nutrition/exercise/vitamin. Plans breast Plans pp contraception- unsure  Continue PNV.   Discussed may have some spotting or cramping/contractions as result of SVE.   Discussed will talk about IOL at 41 weeks at her visit next week  Informed of GBS negative status  RTC 1 week or PRN

## 2020-09-24 ENCOUNTER — ROUTINE PRENATAL (OUTPATIENT)
Dept: OBGYN | Facility: CLINIC | Age: 20
End: 2020-09-24

## 2020-09-24 ENCOUNTER — APPOINTMENT (OUTPATIENT)
Dept: RADIOLOGY | Facility: MEDICAL CENTER | Age: 20
End: 2020-09-24
Attending: OBSTETRICS & GYNECOLOGY
Payer: COMMERCIAL

## 2020-09-24 ENCOUNTER — HOSPITAL ENCOUNTER (OUTPATIENT)
Facility: MEDICAL CENTER | Age: 20
End: 2020-09-24
Attending: OBSTETRICS & GYNECOLOGY | Admitting: OBSTETRICS & GYNECOLOGY
Payer: COMMERCIAL

## 2020-09-24 VITALS
HEART RATE: 95 BPM | HEIGHT: 61 IN | BODY MASS INDEX: 32.28 KG/M2 | TEMPERATURE: 98 F | DIASTOLIC BLOOD PRESSURE: 73 MMHG | WEIGHT: 171 LBS | OXYGEN SATURATION: 97 % | SYSTOLIC BLOOD PRESSURE: 125 MMHG | RESPIRATION RATE: 18 BRPM

## 2020-09-24 VITALS — SYSTOLIC BLOOD PRESSURE: 102 MMHG | WEIGHT: 171 LBS | BODY MASS INDEX: 32.71 KG/M2 | DIASTOLIC BLOOD PRESSURE: 60 MMHG

## 2020-09-24 DIAGNOSIS — Z34.80 SUPERVISION OF OTHER NORMAL PREGNANCY, ANTEPARTUM: Primary | ICD-10-CM

## 2020-09-24 PROCEDURE — 59025 FETAL NON-STRESS TEST: CPT

## 2020-09-24 PROCEDURE — 90686 IIV4 VACC NO PRSV 0.5 ML IM: CPT | Performed by: NURSE PRACTITIONER

## 2020-09-24 PROCEDURE — 76819 FETAL BIOPHYS PROFIL W/O NST: CPT

## 2020-09-24 PROCEDURE — 90471 IMMUNIZATION ADMIN: CPT | Performed by: NURSE PRACTITIONER

## 2020-09-24 PROCEDURE — 90040 PR PRENATAL FOLLOW UP: CPT | Performed by: NURSE PRACTITIONER

## 2020-09-24 NOTE — PROGRESS NOTES
1045 Discharge instructions given including when to return to the hospital, Pt verbalizes understanding at this time. All questions answered.  0155 Pt ambulated off the unit with FOB and personal belongings in place.

## 2020-09-24 NOTE — PROGRESS NOTES
Patient comes in from the office for BPP and BENSON.  She feels fetal movement and occasional contractions.  She denies leaking/bleeding.      Monitors applied, moderate variabilty with accels noted.  Uterine irritability noted.      Dr Rock notified and bpp and benson ordered.  Results reviewed with Dr Rock, patient to be discharged.      Katiana CONN given report to discharge patient.

## 2020-09-24 NOTE — PROGRESS NOTES
Pt here today for OB follow up  Pt states no complaints   Reports +FM  Good # [phone]   Pharmacy Confirmed.  Chaperone offered and not indicated.  GBS negative    Pt would like flu vaccine, consent signed   Flu vaccine given. RIGHT Deltoid. VIS given and screening check list reviewed with pt.

## 2020-09-24 NOTE — PROGRESS NOTES
S) Pt is a 19 y.o.   at 37w6d  gestation. Routine prenatal care today. Complains of feeling like something is wrong with this pregnancy. She has a history of prior full term  with loss of infant at 10 days of age. The only problem that she remembers is that they said the baby had no fluid. She is concerned about this again today with this baby. Records say ?NEC. Will do US today to check BENSON. She declines any questionable LOF. Discussed IOL, and desires delivery at 40 weeks if possible. Orders placed today. Labor precautions reviewed, all questions answered.    Fetal movement Normal  Cramping no  VB no  LOF no   Denies dysuria. Generally feels well today. Good self-care activities identified. Denies headaches, swelling, visual changes, or epigastric pain .     O) /60   Wt 77.6 kg (171 lb)         Labs:       PNL: WNL       GCT: 186, but 3 hour WNL        AFP: Not Examined       GBS: negative       Pertinent ultrasound -        20- Survey WNL, BENSON 13.12cm, c/w prev dating    A) IUP at 37w6d       S=D    Quick BSUS for patient comfort- Fetus in vertex presentation, good movement and fetal breathing noted. Only 1 pocket of fluid seen (not measured due to comfort of ultrasounder). Discussed with MD in clinic and recommended sending to L&D for further evaluation and official ultrasound.         Patient Active Problem List    Diagnosis Date Noted   • Supervision of other normal pregnancy, antepartum 2020   • History of  death 2020   • Papanicolaou smear of cervix with low grade squamous intraepithelial lesion (LGSIL) 2020          SVE: deferred       Chaperone offered: n/a         TDAP: yes       FLU: yes        BTL: no       : n/a       C/S Consent: n/a       IOL or C/S scheduled: no- referral placed today       LAST PAP: 3/27/20- LGSIL- repeat pap in 1 year         P) s/s ptl vs general discomforts. Fetal movements reviewed. General ed and anticipatory guidance.  Nutrition/exercise/vitamin. Plans breast Plans pp contraception- unsure  Continue PNV.     Call to L&D charge nurse to inform of patients pending arrival.

## 2020-09-24 NOTE — PROGRESS NOTES
"19 y.o.  at 37w6d for concerns with pregnacny. She reports having full prenatal care and no complications. Had previous pregnancy where baby had no fluid. She denies contractions, abdominal pain, vaginal bleeding, discharge, fowl smelling urine, chest pain, or SOB.     /73   Pulse 95   Temp 36.7 °C (98 °F) (Temporal)   Resp 18   Ht 1.56 m (5' 1.42\")   Wt 77.6 kg (171 lb)   LMP 11/15/2019   SpO2 97%   BMI 31.87 kg/m²   Gen: Alert in NAD  CV: RRR. No murmers.   Resp: CTAB.   Abd: Gravid, non ttp  Ext: no LE edema        A/P  #IUP  -History of prenatal death and oligohydramnios (BPP- ,BENSON- 17)  -No signs of labor  -Resume normal prenatal care and monitoring          "

## 2020-10-01 ENCOUNTER — ROUTINE PRENATAL (OUTPATIENT)
Dept: OBGYN | Facility: CLINIC | Age: 20
End: 2020-10-01
Payer: COMMERCIAL

## 2020-10-01 VITALS
SYSTOLIC BLOOD PRESSURE: 102 MMHG | WEIGHT: 173.72 LBS | BODY MASS INDEX: 32.38 KG/M2 | DIASTOLIC BLOOD PRESSURE: 70 MMHG

## 2020-10-01 DIAGNOSIS — Z34.83 ENCOUNTER FOR SUPERVISION OF OTHER NORMAL PREGNANCY IN THIRD TRIMESTER: Primary | ICD-10-CM

## 2020-10-01 PROCEDURE — 90040 PR PRENATAL FOLLOW UP: CPT | Performed by: NURSE PRACTITIONER

## 2020-10-01 NOTE — PROGRESS NOTES
OB follow up    Good fetal movement.  No VB, LOF or UC's.  Pt already received flu shot  Phone #  [phone]  Preferred pharmacy confirmed.  IOL instructions given today   Pt has no concerns at this time

## 2020-10-01 NOTE — PROGRESS NOTES
S:  Pt is  at 38w6d here for routine OB follow up.  No concerns today.  Was seen in hospital follow last GLORIA to check BENSON and reassure patient of  fetal wellbeing. Pt has a hx of  death. Reports good FM.  Denies VB, LOF, RUCs, or vaginal DC.     O:  Please see above vitals.        FHTs: 135        Fundal ht: 39 cm        Fetal position: vertex        SVE: cl/th/posterior        GBS negative  -- reviewed w pt.      A:  IUP at 38w6d  Patient Active Problem List    Diagnosis Date Noted   • Supervision of other normal pregnancy, antepartum 2020   • History of  death 2020   • Papanicolaou smear of cervix with low grade squamous intraepithelial lesion (LGSIL) 2020       P:  1.  Anticipatory guidance given         2.  Labor precautions given.  Instructions given on where to go.  Pt receptive to education.         3.  D/w pt IOL policy.  IOL scheduled for 10/11.        4.  Questions answered.         5.  Encouraged adequate water intake, walking 30-60 min daily, pelvic rocking, birthing ball       6.  F/u 1wk

## 2020-10-08 ENCOUNTER — ROUTINE PRENATAL (OUTPATIENT)
Dept: OBGYN | Facility: CLINIC | Age: 20
End: 2020-10-08
Payer: COMMERCIAL

## 2020-10-08 VITALS — DIASTOLIC BLOOD PRESSURE: 60 MMHG | SYSTOLIC BLOOD PRESSURE: 110 MMHG | BODY MASS INDEX: 32.69 KG/M2 | WEIGHT: 175.4 LBS

## 2020-10-08 DIAGNOSIS — Z34.80 SUPERVISION OF OTHER NORMAL PREGNANCY, ANTEPARTUM: Primary | ICD-10-CM

## 2020-10-08 PROCEDURE — 90040 PR PRENATAL FOLLOW UP: CPT | Performed by: NURSE PRACTITIONER

## 2020-10-08 NOTE — PROGRESS NOTES
S) Pt is a 19 y.o.   at 39w6d  gestation. Routine prenatal care today. No complaints today. Has OP gel and IOL scheduled. Reviewed last US done at L&D. Labor precautions reviewed, all questions answered.    Fetal movement Normal  Cramping no  VB no  LOF no   Denies dysuria. Generally feels well today. Good self-care activities identified. Denies headaches, swelling, visual changes, or epigastric pain .     O) /60   Wt 79.6 kg (175 lb 6.4 oz)         Labs:       PNL: WNL       GCT: 186, but 3 hour WNL        AFP: Not Examined       GBS: negative       Pertinent ultrasound -        20- Survey WNL, BENSON 13.12cm, c/w prev dating.    A) IUP at 39w6d       S=D         Patient Active Problem List    Diagnosis Date Noted   • Supervision of other normal pregnancy, antepartum 2020   • History of  death 2020   • Papanicolaou smear of cervix with low grade squamous intraepithelial lesion (LGSIL) 2020          SVE: closed/50/-2       Chaperone offered: yes         TDAP: yes       FLU: yes        BTL: no       : n/a       C/S Consent: n/a       IOL or C/S scheduled: yes - 10/11/20 OP gel, 10/12/20 IP IOL       LAST PAP: 3/27/20 LGSIL- repeat pap in 1 year         P) s/s ptl vs general discomforts. Fetal movements reviewed. General ed and anticipatory guidance. Nutrition/exercise/vitamin. Plans breast Plans pp contraception- unsure  Continue PNV.

## 2020-10-08 NOTE — PROGRESS NOTES
OB follow up   + fetal movement. Active  No VB, LOF or UC's.  Flu vaccine offered Already got it   Phone # [phone]  Preferred pharmacy confirmed.  No complaints as of today

## 2020-10-11 ENCOUNTER — HOSPITAL ENCOUNTER (INPATIENT)
Facility: MEDICAL CENTER | Age: 20
LOS: 3 days | End: 2020-10-14
Attending: OBSTETRICS & GYNECOLOGY | Admitting: OBSTETRICS & GYNECOLOGY
Payer: COMMERCIAL

## 2020-10-11 ENCOUNTER — APPOINTMENT (OUTPATIENT)
Dept: OBGYN | Facility: MEDICAL CENTER | Age: 20
End: 2020-10-11
Attending: OBSTETRICS & GYNECOLOGY
Payer: COMMERCIAL

## 2020-10-11 LAB
BASOPHILS # BLD AUTO: 0.5 % (ref 0–1.8)
BASOPHILS # BLD: 0.05 K/UL (ref 0–0.12)
COVID ORDER STATUS COVID19: NORMAL
EOSINOPHIL # BLD AUTO: 0.03 K/UL (ref 0–0.51)
EOSINOPHIL NFR BLD: 0.3 % (ref 0–6.9)
ERYTHROCYTE [DISTWIDTH] IN BLOOD BY AUTOMATED COUNT: 51.5 FL (ref 35.9–50)
HCT VFR BLD AUTO: 44.4 % (ref 37–47)
HGB BLD-MCNC: 14.2 G/DL (ref 12–16)
HOLDING TUBE BB 8507: NORMAL
IMM GRANULOCYTES # BLD AUTO: 0.08 K/UL (ref 0–0.11)
IMM GRANULOCYTES NFR BLD AUTO: 0.8 % (ref 0–0.9)
LYMPHOCYTES # BLD AUTO: 2.08 K/UL (ref 1–4.8)
LYMPHOCYTES NFR BLD: 20.4 % (ref 22–41)
MCH RBC QN AUTO: 33.2 PG (ref 27–33)
MCHC RBC AUTO-ENTMCNC: 32 G/DL (ref 33.6–35)
MCV RBC AUTO: 103.7 FL (ref 81.4–97.8)
MONOCYTES # BLD AUTO: 1.07 K/UL (ref 0–0.85)
MONOCYTES NFR BLD AUTO: 10.5 % (ref 0–13.4)
NEUTROPHILS # BLD AUTO: 6.9 K/UL (ref 2–7.15)
NEUTROPHILS NFR BLD: 67.5 % (ref 44–72)
NRBC # BLD AUTO: 0 K/UL
NRBC BLD-RTO: 0 /100 WBC
PLATELET # BLD AUTO: 141 K/UL (ref 164–446)
PMV BLD AUTO: 10.8 FL (ref 9–12.9)
RBC # BLD AUTO: 4.28 M/UL (ref 4.2–5.4)
WBC # BLD AUTO: 10.2 K/UL (ref 4.8–10.8)

## 2020-10-11 PROCEDURE — C9803 HOPD COVID-19 SPEC COLLECT: HCPCS | Performed by: NURSE PRACTITIONER

## 2020-10-11 PROCEDURE — 36415 COLL VENOUS BLD VENIPUNCTURE: CPT

## 2020-10-11 PROCEDURE — 700111 HCHG RX REV CODE 636 W/ 250 OVERRIDE (IP): Performed by: NURSE PRACTITIONER

## 2020-10-11 PROCEDURE — 85025 COMPLETE CBC W/AUTO DIFF WBC: CPT

## 2020-10-11 PROCEDURE — 770002 HCHG ROOM/CARE - OB PRIVATE (112)

## 2020-10-11 PROCEDURE — 87426 SARSCOV CORONAVIRUS AG IA: CPT

## 2020-10-11 PROCEDURE — 700105 HCHG RX REV CODE 258: Performed by: NURSE PRACTITIONER

## 2020-10-11 RX ORDER — OXYTOCIN 10 [USP'U]/ML
10 INJECTION, SOLUTION INTRAMUSCULAR; INTRAVENOUS
Status: DISCONTINUED | OUTPATIENT
Start: 2020-10-11 | End: 2020-10-12 | Stop reason: HOSPADM

## 2020-10-11 RX ORDER — IBUPROFEN 800 MG/1
800 TABLET ORAL EVERY 8 HOURS PRN
Status: DISCONTINUED | OUTPATIENT
Start: 2020-10-11 | End: 2020-10-14 | Stop reason: HOSPADM

## 2020-10-11 RX ORDER — CARBOPROST TROMETHAMINE 250 UG/ML
250 INJECTION, SOLUTION INTRAMUSCULAR
Status: DISCONTINUED | OUTPATIENT
Start: 2020-10-11 | End: 2020-10-12 | Stop reason: HOSPADM

## 2020-10-11 RX ORDER — METOCLOPRAMIDE HYDROCHLORIDE 5 MG/ML
10 INJECTION INTRAMUSCULAR; INTRAVENOUS EVERY 6 HOURS PRN
Status: DISCONTINUED | OUTPATIENT
Start: 2020-10-11 | End: 2020-10-14 | Stop reason: HOSPADM

## 2020-10-11 RX ORDER — MISOPROSTOL 200 UG/1
800 TABLET ORAL
Status: DISCONTINUED | OUTPATIENT
Start: 2020-10-11 | End: 2020-10-12 | Stop reason: HOSPADM

## 2020-10-11 RX ORDER — ACETAMINOPHEN 325 MG/1
650 TABLET ORAL EVERY 6 HOURS PRN
Status: DISCONTINUED | OUTPATIENT
Start: 2020-10-11 | End: 2020-10-14 | Stop reason: HOSPADM

## 2020-10-11 RX ORDER — CITRIC ACID/SODIUM CITRATE 334-500MG
30 SOLUTION, ORAL ORAL EVERY 6 HOURS PRN
Status: DISCONTINUED | OUTPATIENT
Start: 2020-10-11 | End: 2020-10-12 | Stop reason: HOSPADM

## 2020-10-11 RX ORDER — METHYLERGONOVINE MALEATE 0.2 MG/ML
0.2 INJECTION INTRAVENOUS
Status: DISCONTINUED | OUTPATIENT
Start: 2020-10-11 | End: 2020-10-12 | Stop reason: HOSPADM

## 2020-10-11 RX ORDER — SODIUM CHLORIDE, SODIUM LACTATE, POTASSIUM CHLORIDE, CALCIUM CHLORIDE 600; 310; 30; 20 MG/100ML; MG/100ML; MG/100ML; MG/100ML
INJECTION, SOLUTION INTRAVENOUS CONTINUOUS
Status: DISPENSED | OUTPATIENT
Start: 2020-10-11 | End: 2020-10-12

## 2020-10-11 RX ADMIN — SODIUM CHLORIDE, POTASSIUM CHLORIDE, SODIUM LACTATE AND CALCIUM CHLORIDE: 600; 310; 30; 20 INJECTION, SOLUTION INTRAVENOUS at 23:05

## 2020-10-11 RX ADMIN — OXYTOCIN 1 MILLI-UNITS/MIN: 10 INJECTION, SOLUTION INTRAMUSCULAR; INTRAVENOUS at 23:04

## 2020-10-12 ENCOUNTER — ANESTHESIA EVENT (OUTPATIENT)
Dept: ANESTHESIOLOGY | Facility: MEDICAL CENTER | Age: 20
End: 2020-10-12
Payer: COMMERCIAL

## 2020-10-12 ENCOUNTER — ANESTHESIA (OUTPATIENT)
Dept: ANESTHESIOLOGY | Facility: MEDICAL CENTER | Age: 20
End: 2020-10-12
Payer: COMMERCIAL

## 2020-10-12 LAB
SARS-COV+SARS-COV-2 AG RESP QL IA.RAPID: NOTDETECTED
SPECIMEN SOURCE: NORMAL

## 2020-10-12 PROCEDURE — 700111 HCHG RX REV CODE 636 W/ 250 OVERRIDE (IP)

## 2020-10-12 PROCEDURE — A9270 NON-COVERED ITEM OR SERVICE: HCPCS | Performed by: NURSE PRACTITIONER

## 2020-10-12 PROCEDURE — 304965 HCHG RECOVERY SERVICES

## 2020-10-12 PROCEDURE — 700111 HCHG RX REV CODE 636 W/ 250 OVERRIDE (IP): Performed by: NURSE PRACTITIONER

## 2020-10-12 PROCEDURE — 303615 HCHG EPIDURAL/SPINAL ANESTHESIA FOR LABOR

## 2020-10-12 PROCEDURE — 59409 OBSTETRICAL CARE: CPT

## 2020-10-12 PROCEDURE — 770002 HCHG ROOM/CARE - OB PRIVATE (112)

## 2020-10-12 PROCEDURE — 700111 HCHG RX REV CODE 636 W/ 250 OVERRIDE (IP): Performed by: ANESTHESIOLOGY

## 2020-10-12 PROCEDURE — 700105 HCHG RX REV CODE 258: Performed by: NURSE PRACTITIONER

## 2020-10-12 PROCEDURE — 700102 HCHG RX REV CODE 250 W/ 637 OVERRIDE(OP): Performed by: NURSE PRACTITIONER

## 2020-10-12 PROCEDURE — 0HQ9XZZ REPAIR PERINEUM SKIN, EXTERNAL APPROACH: ICD-10-PCS | Performed by: FAMILY MEDICINE

## 2020-10-12 PROCEDURE — 59400 OBSTETRICAL CARE: CPT | Performed by: FAMILY MEDICINE

## 2020-10-12 PROCEDURE — 700101 HCHG RX REV CODE 250: Performed by: ANESTHESIOLOGY

## 2020-10-12 RX ORDER — ROPIVACAINE HYDROCHLORIDE 2 MG/ML
INJECTION, SOLUTION EPIDURAL; INFILTRATION; PERINEURAL CONTINUOUS
Status: DISCONTINUED | OUTPATIENT
Start: 2020-10-12 | End: 2020-10-12

## 2020-10-12 RX ORDER — LIDOCAINE HYDROCHLORIDE AND EPINEPHRINE 15; 5 MG/ML; UG/ML
INJECTION, SOLUTION EPIDURAL
Status: COMPLETED | OUTPATIENT
Start: 2020-10-12 | End: 2020-10-12

## 2020-10-12 RX ORDER — BUPIVACAINE HYDROCHLORIDE 2.5 MG/ML
INJECTION, SOLUTION EPIDURAL; INFILTRATION; INTRACAUDAL
Status: COMPLETED
Start: 2020-10-12 | End: 2020-10-12

## 2020-10-12 RX ORDER — SODIUM CHLORIDE, SODIUM LACTATE, POTASSIUM CHLORIDE, CALCIUM CHLORIDE 600; 310; 30; 20 MG/100ML; MG/100ML; MG/100ML; MG/100ML
INJECTION, SOLUTION INTRAVENOUS PRN
Status: DISCONTINUED | OUTPATIENT
Start: 2020-10-12 | End: 2020-10-14 | Stop reason: HOSPADM

## 2020-10-12 RX ORDER — SODIUM CHLORIDE, SODIUM LACTATE, POTASSIUM CHLORIDE, AND CALCIUM CHLORIDE .6; .31; .03; .02 G/100ML; G/100ML; G/100ML; G/100ML
1000 INJECTION, SOLUTION INTRAVENOUS
Status: DISCONTINUED | OUTPATIENT
Start: 2020-10-12 | End: 2020-10-12 | Stop reason: HOSPADM

## 2020-10-12 RX ORDER — SODIUM CHLORIDE, SODIUM LACTATE, POTASSIUM CHLORIDE, AND CALCIUM CHLORIDE .6; .31; .03; .02 G/100ML; G/100ML; G/100ML; G/100ML
250 INJECTION, SOLUTION INTRAVENOUS PRN
Status: DISCONTINUED | OUTPATIENT
Start: 2020-10-12 | End: 2020-10-12 | Stop reason: HOSPADM

## 2020-10-12 RX ORDER — BUPIVACAINE HYDROCHLORIDE 2.5 MG/ML
INJECTION, SOLUTION EPIDURAL; INFILTRATION; INTRACAUDAL
Status: COMPLETED | OUTPATIENT
Start: 2020-10-12 | End: 2020-10-12

## 2020-10-12 RX ORDER — MISOPROSTOL 200 UG/1
600 TABLET ORAL
Status: DISCONTINUED | OUTPATIENT
Start: 2020-10-12 | End: 2020-10-14 | Stop reason: HOSPADM

## 2020-10-12 RX ORDER — ROPIVACAINE HYDROCHLORIDE 2 MG/ML
INJECTION, SOLUTION EPIDURAL; INFILTRATION; PERINEURAL
Status: COMPLETED
Start: 2020-10-12 | End: 2020-10-12

## 2020-10-12 RX ORDER — BISACODYL 10 MG
10 SUPPOSITORY, RECTAL RECTAL PRN
Status: DISCONTINUED | OUTPATIENT
Start: 2020-10-12 | End: 2020-10-14 | Stop reason: HOSPADM

## 2020-10-12 RX ORDER — DOCUSATE SODIUM 100 MG/1
100 CAPSULE, LIQUID FILLED ORAL 2 TIMES DAILY PRN
Status: DISCONTINUED | OUTPATIENT
Start: 2020-10-12 | End: 2020-10-14 | Stop reason: HOSPADM

## 2020-10-12 RX ORDER — METHYLERGONOVINE MALEATE 0.2 MG/ML
0.2 INJECTION INTRAVENOUS
Status: DISCONTINUED | OUTPATIENT
Start: 2020-10-12 | End: 2020-10-14 | Stop reason: HOSPADM

## 2020-10-12 RX ORDER — SODIUM CHLORIDE, SODIUM LACTATE, POTASSIUM CHLORIDE, CALCIUM CHLORIDE 600; 310; 30; 20 MG/100ML; MG/100ML; MG/100ML; MG/100ML
INJECTION, SOLUTION INTRAVENOUS
Status: ACTIVE
Start: 2020-10-12 | End: 2020-10-12

## 2020-10-12 RX ORDER — CARBOPROST TROMETHAMINE 250 UG/ML
250 INJECTION, SOLUTION INTRAMUSCULAR
Status: DISCONTINUED | OUTPATIENT
Start: 2020-10-12 | End: 2020-10-14 | Stop reason: HOSPADM

## 2020-10-12 RX ADMIN — BUPIVACAINE HYDROCHLORIDE 6 ML: 2.5 INJECTION, SOLUTION EPIDURAL; INFILTRATION; INTRACAUDAL; PERINEURAL at 04:29

## 2020-10-12 RX ADMIN — SODIUM CHLORIDE, POTASSIUM CHLORIDE, SODIUM LACTATE AND CALCIUM CHLORIDE: 600; 310; 30; 20 INJECTION, SOLUTION INTRAVENOUS at 04:15

## 2020-10-12 RX ADMIN — IBUPROFEN 800 MG: 800 TABLET, FILM COATED ORAL at 16:46

## 2020-10-12 RX ADMIN — FENTANYL CITRATE 100 MCG: 50 INJECTION, SOLUTION INTRAMUSCULAR; INTRAVENOUS at 04:29

## 2020-10-12 RX ADMIN — OXYTOCIN 2000 ML/HR: 10 INJECTION, SOLUTION INTRAMUSCULAR; INTRAVENOUS at 15:10

## 2020-10-12 RX ADMIN — ROPIVACAINE HYDROCHLORIDE 200 MG: 2 INJECTION, SOLUTION EPIDURAL; INFILTRATION at 04:40

## 2020-10-12 RX ADMIN — LIDOCAINE HYDROCHLORIDE,EPINEPHRINE BITARTRATE 3 ML: 15; .005 INJECTION, SOLUTION EPIDURAL; INFILTRATION; INTRACAUDAL; PERINEURAL at 04:29

## 2020-10-12 RX ADMIN — OXYTOCIN 125 ML/HR: 10 INJECTION, SOLUTION INTRAMUSCULAR; INTRAVENOUS at 16:20

## 2020-10-12 RX ADMIN — ROPIVACAINE HYDROCHLORIDE 200 MG: 2 INJECTION, SOLUTION EPIDURAL; INFILTRATION; PERINEURAL at 04:40

## 2020-10-12 SDOH — ECONOMIC STABILITY: FOOD INSECURITY: WITHIN THE PAST 12 MONTHS, YOU WORRIED THAT YOUR FOOD WOULD RUN OUT BEFORE YOU GOT MONEY TO BUY MORE.: NEVER TRUE

## 2020-10-12 SDOH — ECONOMIC STABILITY: FOOD INSECURITY: WITHIN THE PAST 12 MONTHS, THE FOOD YOU BOUGHT JUST DIDN'T LAST AND YOU DIDN'T HAVE MONEY TO GET MORE.: NEVER TRUE

## 2020-10-12 SDOH — ECONOMIC STABILITY: TRANSPORTATION INSECURITY
IN THE PAST 12 MONTHS, HAS LACK OF TRANSPORTATION KEPT YOU FROM MEETINGS, WORK, OR FROM GETTING THINGS NEEDED FOR DAILY LIVING?: NO

## 2020-10-12 SDOH — ECONOMIC STABILITY: TRANSPORTATION INSECURITY
IN THE PAST 12 MONTHS, HAS THE LACK OF TRANSPORTATION KEPT YOU FROM MEDICAL APPOINTMENTS OR FROM GETTING MEDICATIONS?: NO

## 2020-10-12 ASSESSMENT — PATIENT HEALTH QUESTIONNAIRE - PHQ9
2. FEELING DOWN, DEPRESSED, IRRITABLE, OR HOPELESS: NOT AT ALL
1. LITTLE INTEREST OR PLEASURE IN DOING THINGS: NOT AT ALL
SUM OF ALL RESPONSES TO PHQ9 QUESTIONS 1 AND 2: 0

## 2020-10-12 ASSESSMENT — PAIN DESCRIPTION - PAIN TYPE
TYPE: ACUTE PAIN
TYPE: ACUTE PAIN

## 2020-10-12 ASSESSMENT — PAIN SCALES - GENERAL: PAIN_LEVEL: 0

## 2020-10-12 ASSESSMENT — LIFESTYLE VARIABLES: EVER_SMOKED: NEVER

## 2020-10-12 NOTE — ANESTHESIA POSTPROCEDURE EVALUATION
Patient: Gloria Smith    Procedure Summary     Date: 10/12/20 Room / Location:     Anesthesia Start: 0425 Anesthesia Stop: 1503    Procedure: Labor Epidural Diagnosis:     Scheduled Providers:  Responsible Provider: Faizan Carrasco M.D.    Anesthesia Type: epidural ASA Status: 2          Final Anesthesia Type: epidural  Last vitals  BP   Blood Pressure: (!) 98/54    Temp   37.7 °C (99.8 °F)    Pulse   Pulse: 88   Resp   16    SpO2   98 %      Anesthesia Post Evaluation    Patient location during evaluation: floor  Patient participation: complete - patient participated  Level of consciousness: awake and alert  Pain score: 0    Airway patency: patent  Anesthetic complications: no  Cardiovascular status: hemodynamically stable  Respiratory status: acceptable  Hydration status: euvolemic    PONV: none    patient able to participate, but full recovery from regional anesthesia has not occurred and is not expected within the stipulated timeframe for the completion of the evaluation

## 2020-10-12 NOTE — ANESTHESIA TIME REPORT
Anesthesia Start and Stop Event Times     Date Time Event    10/12/2020 0423 Ready for Procedure     0425 Anesthesia Start     1503 Anesthesia Stop        Responsible Staff  10/12/20    Name Role Begin End    Christi Yang M.D. Anesth 0425 0659    Faizan Carrasco M.D. Anesth 0659 1503        Preop Diagnosis (Free Text):  Pre-op Diagnosis             Preop Diagnosis (Codes):    Post op Diagnosis  Pregnant      Premium Reason  A. 3PM - 7AM    Comments:

## 2020-10-12 NOTE — PROGRESS NOTES
2200: report received from mj monsalve, pt moved to 214. Pt admitted, iv started.      2304: pitocin started per cnm orders.    0330: report to garry grey rn

## 2020-10-12 NOTE — ANESTHESIA PROCEDURE NOTES
Epidural Block    Date/Time: 10/12/2020 4:29 AM  Performed by: Christi Yang M.D.  Authorized by: Christi Yang M.D.     Patient Location:  OB  Start Time:  10/12/2020 4:29 AM  End Time:  10/12/2020 4:34 AM  Reason for Block: labor analgesia    patient identified, IV checked, site marked, risks and benefits discussed, surgical consent, monitors and equipment checked, pre-op evaluation and timeout performed    Patient Position:  Sitting  Prep: ChloraPrep, patient draped and sterile technique    Monitoring:  Blood pressure, continuous pulse oximetry and heart rate  Approach:  Midline  Location:  L3-L4  Injection Technique:  SULEIMAN saline  Skin infiltration:  Lidocaine  Strength:  1%  Dose:  3ml  Needle Type:  Tuohy  Needle Gauge:  17 G  Needle Length:  3.5 in  Loss of resistance::  4  Catheter Size:  19 G  Catheter at Skin Depth:  9  Test Dose Result:  Negative   Uneventful, single pass.   on ipad.  Pt tolerated procedure well.

## 2020-10-12 NOTE — PROGRESS NOTES
0330 - Report received. POC discussed.   0400 - Bolus started for epidural.   0428 - Dr Yang at bedside for epidural placement. Timeout complete. All agree.   0435 - Negative test dose appreciated.   0500 - Maria inserted.   0509 - SHELLEY Varela CNM at bedside. AROM, lt mec. Pt comfortable.   0700 - Report given. POC discussed.

## 2020-10-12 NOTE — H&P
History and Physical      Gloria Smith is a 19 y.o. year old female  at 40w2d who presents for IOL. Scheduled for out pt gel, while on monitor, variable decels noted     Subjective:   negative  For CTXS.   negative Feels pain   negative for LOF  negative for vaginal bleeding.   positive for fetal movement    ROS: Pertinent items are noted in HPI.    No past medical history on file.  No past surgical history on file.  OB History    Para Term  AB Living   2 1 1         SAB TAB Ectopic Molar Multiple Live Births             1      # Outcome Date GA Lbr Suresh/2nd Weight Sex Delivery Anes PTL Lv   2 Current            1 Term 18 40w0d   M Vag-Spont EPI N DEC      Birth Comments:  at 10 days old; NEC?     Social History     Socioeconomic History   • Marital status: Single     Spouse name: Not on file   • Number of children: Not on file   • Years of education: Not on file   • Highest education level: Not on file   Occupational History   • Not on file   Social Needs   • Financial resource strain: Not on file   • Food insecurity     Worry: Not on file     Inability: Not on file   • Transportation needs     Medical: Not on file     Non-medical: Not on file   Tobacco Use   • Smoking status: Never Smoker   • Smokeless tobacco: Never Used   Substance and Sexual Activity   • Alcohol use: Never     Frequency: Never   • Drug use: Never   • Sexual activity: Yes     Partners: Male     Comment: not . Planned pregnancy   Lifestyle   • Physical activity     Days per week: Not on file     Minutes per session: Not on file   • Stress: Not on file   Relationships   • Social connections     Talks on phone: Not on file     Gets together: Not on file     Attends Shinto service: Not on file     Active member of club or organization: Not on file     Attends meetings of clubs or organizations: Not on file     Relationship status: Not on file   • Intimate partner violence     Fear of current  "or ex partner: Not on file     Emotionally abused: Not on file     Physically abused: Not on file     Forced sexual activity: Not on file   Other Topics Concern   • Not on file   Social History Narrative   • Not on file     Allergies: Patient has no known allergies.  No current facility-administered medications for this encounter.     Prenatal care with [facility] starting at 12w0d with total of 11 visits, following problems:  Patient Active Problem List    Diagnosis Date Noted   • Supervision of other normal pregnancy, antepartum 2020   • History of  death 2020   • Papanicolaou smear of cervix with low grade squamous intraepithelial lesion (LGSIL) 2020         Objective:      /74   Pulse 86   Temp 36.1 °C (97 °F) (Temporal)   Resp 16   Ht 1.56 m (5' 1.42\")   Wt 79.4 kg (175 lb)   SpO2 97%     General:   no acute distress   Skin:   normal   HEENT:  PERRLA   Lungs:   CTA bilateral   Heart:   S1, S2 normal, no murmur, click, rub or gallop, regular rate and rhythm, brisk carotid upstroke without bruits, peripheral pulses very brisk, chest is clear without rales or wheezing, no pedal edema, no JVD, no hepatosplenomegaly   Abdomen:   gravid, NT   EFW:  0012-6535 g   Pelvis:  Exam deferred., proven to unknown g   FHTs: 135 BPM + accels variable decels moderate variability   Contractions: 0 contractions, soft to palpation   Uterine Size: S=D   Presentations: Cephalic per RN   Cervix: Per RN    Dilation: 3cm    Effacement: 50%    Station:  -2    Consistency: Soft    Position: Middle     Complete OB US  2020 8:01 AM     HISTORY/REASON FOR EXAM:  Evaluate fetal anatomy     TECHNIQUE/EXAM DESCRIPTION: OB complete ultrasound.     COMPARISON:  None     FINDINGS:  Fetal Lie:  Vertex  LMP:  11/15/2019  Clinical ASTRID by LMP:  10/9/2020     Placenta (Location):  Anterior  Placenta Previa: No  Placental Grade: I     Amniotic Fluid Volume:  BENSON = 13.12 cm     Fetal Heart Rate:  149 bpm     Cervical " Length:  3.97 cm transabdominal     No maternal adnexal mass is identified.     Umbilical Artery S/D Ratio(s):  Not applicable     Fetal Anatomy  (Seen or Not Seen)  Lateral Ventricles     Seen  Cisterna Magna        Seen  Cerebellum              Seen  CSP             Seen  Orbits             Seen  Face/Lips                Seen  Cord Insertion         Seen  Placental CI         Seen  4 Chamber Heart     Seen  LVOT               Seen  RVOT              Seen  3 Vessel View     Seen  Stomach       Seen  Kidneys                   Seen  Urinary Bladder      Seen  Spine                       Seen  3 Vessel Cord          Seen  Both Upper Extremities    Seen  Both Lower Extremities    Seen  Diaphragm             Seen  Movement       Seen  Gender:  Likely male     Fetal Biometry  BPD    4.68 cm, 20 weeks, 1 day  HC    18.34 cm, 20 weeks, 5 days  AC    15.76 cm, 20 weeks, 6 days  Femur Length    3.31 cm, 20 weeks, 2 days  Humerus Length    3.10 cm, 20 weeks, 2 days  Cerebellum Diameter   2.06 cm     EGA by this US:  20 weeks, 2 days  ASTRID by this US: 10/7/2020  ASTRID by 1st US:  10/9/2020 by MD     Estimated Fetal Weight:  367 grams  EFW Percentile: 79.5%     Comments:     IMPRESSION:     1.  Single intrauterine pregnancy of an estimated gestational age of 20 weeks, 2 days with an estimated date of delivery of 10/7/2020.     2.  No anatomic abnormality identified.       Lab Review  Lab:   Blood type: B     Recent Results (from the past 5880 hour(s))   POCT Pregnancy    Collection Time: 03/02/20  8:43 AM   Result Value Ref Range    POC Urine Pregnancy Test Positive Negative    Internal Control Positive Positive     Internal Control Negative Negative    THINPREP RFLX HPV ASCUS W/CTNG    Collection Time: 03/27/20  8:58 AM   Result Value Ref Range    Cytology Reg See Path Report     C. trachomatis by PCR Negative Negative    N. gonorrhoeae by PCR Negative Negative    Source Endo/Cervical    HEP C VIRUS ANTIBODY    Collection Time:  03/27/20  9:03 AM   Result Value Ref Range    Hepatitis C Antibody Non-Reactive Non-Reactive   URINE DRUG SCREEN W/CONF (AR)    Collection Time: 03/27/20  9:03 AM   Result Value Ref Range    Urine Amphetamine-Methamphetam Negative Cutoff 300 ng/mL    Barbiturates Negative Cutoff 200 ng/mL    Benzodiazepines Negative Cutoff 200 ng/mL    Propoxyphene Negative Cutoff 300 ng/mL    Cocaine Metabolite Negative Cutoff 150 ng/mL    Methadone Negative Cutoff 150 ng/mL    Codeine-Morphine Negative Cutoff 300 ng/mL    Phencyclidine -Pcp Negative Cutoff 25 ng/mL    Cannabinoid Metab Negative Cutoff 20 ng/mL    Drug Comment Urine Drugs See Note    PREG CNTR PRENATAL PN    Collection Time: 03/27/20  9:03 AM   Result Value Ref Range    Color Yellow     Character Cloudy (A)     Specific Gravity 1.017 <1.035    Ph 8.0 5.0 - 8.0    Glucose Negative Negative mg/dL    Ketones Negative Negative mg/dL    Protein Negative Negative mg/dL    Bilirubin Negative Negative    Urobilinogen, Urine 0.2 Negative    Nitrite Negative Negative    Leukocyte Esterase Negative Negative    Occult Blood Negative Negative    Micro Urine Req Microscopic     WBC 10.2 4.8 - 10.8 K/uL    RBC 4.30 4.20 - 5.40 M/uL    Hemoglobin 13.9 12.0 - 16.0 g/dL    Hematocrit 40.5 37.0 - 47.0 %    MCV 94.2 81.4 - 97.8 fL    MCH 32.3 27.0 - 33.0 pg    MCHC 34.3 33.6 - 35.0 g/dL    RDW 43.6 35.9 - 50.0 fL    Platelet Count 187 164 - 446 K/uL    MPV 10.2 9.0 - 12.9 fL    Neutrophils-Polys 73.30 (H) 44.00 - 72.00 %    Lymphocytes 17.70 (L) 22.00 - 41.00 %    Monocytes 7.20 0.00 - 13.40 %    Eosinophils 0.70 0.00 - 6.90 %    Basophils 0.50 0.00 - 1.80 %    Immature Granulocytes 0.60 0.00 - 0.90 %    Nucleated RBC 0.00 /100 WBC    Neutrophils (Absolute) 7.50 (H) 2.00 - 7.15 K/uL    Lymphs (Absolute) 1.81 1.00 - 4.80 K/uL    Monos (Absolute) 0.74 0.00 - 0.85 K/uL    Eos (Absolute) 0.07 0.00 - 0.51 K/uL    Baso (Absolute) 0.05 0.00 - 0.12 K/uL    Immature Granulocytes (abs) 0.06  0.00 - 0.11 K/uL    NRBC (Absolute) 0.00 K/uL    Hepatitis B Surface Antigen Non-Reactive Non-Reactive    Rubella IgG Antibody 254.00 IU/mL    Syphilis, Treponemal Qual Non-Reactive Non-Reactive   HIV AG/AB COMBO ASSAY SCREENING    Collection Time: 03/27/20  9:03 AM   Result Value Ref Range    HIV Ag/Ab Combo Assay Non-Reactive Non Reactive   OP Prenatal Panel-Blood Bank    Collection Time: 03/27/20  9:03 AM   Result Value Ref Range    ABO Grouping Only B     Rh Grouping Only POS     Antibody Screen Scrn NEG    URINE MICROSCOPIC (W/UA)    Collection Time: 03/27/20  9:03 AM   Result Value Ref Range    WBC 0-2 /hpf    RBC 0-2 /hpf    Bacteria Few (A) None /hpf    Epithelial Cells Negative /hpf    Amorphous Crystal Present /hpf    Hyaline Cast 0-2 /lpf   CBC WITH DIFFERENTIAL    Collection Time: 07/02/20  9:56 AM   Result Value Ref Range    WBC 11.5 (H) 4.8 - 10.8 K/uL    RBC 4.11 (L) 4.20 - 5.40 M/uL    Hemoglobin 13.4 12.0 - 16.0 g/dL    Hematocrit 40.5 37.0 - 47.0 %    MCV 98.5 (H) 81.4 - 97.8 fL    MCH 32.6 27.0 - 33.0 pg    MCHC 33.1 (L) 33.6 - 35.0 g/dL    RDW 49.2 35.9 - 50.0 fL    Platelet Count 175 164 - 446 K/uL    MPV 10.1 9.0 - 12.9 fL    Neutrophils-Polys 77.90 (H) 44.00 - 72.00 %    Lymphocytes 14.40 (L) 22.00 - 41.00 %    Monocytes 5.10 0.00 - 13.40 %    Eosinophils 0.40 0.00 - 6.90 %    Basophils 0.50 0.00 - 1.80 %    Immature Granulocytes 1.70 (H) 0.00 - 0.90 %    Nucleated RBC 0.00 /100 WBC    Neutrophils (Absolute) 8.95 (H) 2.00 - 7.15 K/uL    Lymphs (Absolute) 1.65 1.00 - 4.80 K/uL    Monos (Absolute) 0.59 0.00 - 0.85 K/uL    Eos (Absolute) 0.05 0.00 - 0.51 K/uL    Baso (Absolute) 0.06 0.00 - 0.12 K/uL    Immature Granulocytes (abs) 0.19 (H) 0.00 - 0.11 K/uL    NRBC (Absolute) 0.00 K/uL   GLUCOSE 1HR GESTATIONAL    Collection Time: 07/02/20  9:56 AM   Result Value Ref Range    Glucose, Post Dose 186 (H) 70 - 139 mg/dL   T.PALLIDUM AB EIA    Collection Time: 07/02/20  9:56 AM   Result Value Ref Range     Syphilis, Treponemal Qual Non-Reactive Non-Reactive   GTT  (GESTATIONAL GLUCOSE 3 HR)    Collection Time: 20  9:07 AM   Result Value Ref Range    Baseline Glucose 81 65 - 95 mg/dL    Glucose 1 Hour 178 65 - 180 mg/dL    Glucose 2 Hour 153 65 - 155 mg/dL    Glucose 3 Hour 122 65 - 140 mg/dL   GRP B STREP, BY PCR (ALLISON BROTH)    Collection Time: 09/10/20  8:53 AM    Specimen: Genital   Result Value Ref Range    Strep Gp B DNA PCR Negative Negative        Assessment:   1.  IUP at 40w2d  2.  Labor status: Not in labor.  3.  Cat 2 FHTs  4.  Obstetrical history significant for   Patient Active Problem List    Diagnosis Date Noted   • Supervision of other normal pregnancy, antepartum 2020   • History of  death 2020   • Papanicolaou smear of cervix with low grade squamous intraepithelial lesion (LGSIL) 2020   .      Plan:     Admit to L&D  GBS negative  Routine labs  Pain management prn  Pitocin    Sylvie Varela, CNM, APRN

## 2020-10-12 NOTE — ANESTHESIA PREPROCEDURE EVALUATION
20 yo  at 40-2 requesting epidural for labor    Relevant Problems   No relevant active problems       Physical Exam    Airway   Mallampati: II  TM distance: >3 FB  Neck ROM: full       Cardiovascular - normal exam  Rhythm: regular  Rate: normal  (-) murmur     Dental - normal exam           Pulmonary - normal exam  Breath sounds clear to auscultation     Abdominal    Neurological - normal exam                 Anesthesia Plan    ASA 2       Plan - epidural   Neuraxial block will be labor analgesia              Pertinent diagnostic labs and testing reviewed    Informed Consent:    Anesthetic plan and risks discussed with patient.

## 2020-10-12 NOTE — ANESTHESIA QCDR
2019 [facility] Clinical Data Registry (for Quality Improvement)     Postoperative nausea/vomiting risk protocol (Adult = 18 yrs and Pediatric 3-17 yrs)- (430 and 463)  General inhalation anesthetic (NOT TIVA) with PONV risk factors: No  Provision of anti-emetic therapy with at least 2 different classes of agents: N/A  Patient DID NOT receive anti-emetic therapy and reason is documented in Medical Record: N/A    Multimodal Pain Management- (477)  Non-emergent surgery AND patient age >= 18:   Use of Multimodal Pain Management, two or more drugs and/or interventions, NOT including systemic opioids:   Exception: Documented allergy to multiple classes of analgesics:     Smoking Abstinence (404)  Patient is current smoker (cigarette, pipe, e-cig, marijuanna):   Elective Surgery:   Abstinence instructions provided prior to day of surgery:   Patient abstained from smoking on day of surgery:     Pre-Op Beta-Blocker in Isolated CABG (44)  Isolated CABG AND patient age >= 18:   Beta-blocker admin within 24 hours of surgical incision:   Exception:of medical reason(s) for not administering beta blocker within 24 hours prior to surgical incision (e.g., not  indicated,other medical reason):     PACU assessment of acute postoperative pain prior to Anesthesia Care End- Applies to Patients Age = 18- (ABG7)  Initial PACU pain score is which of the following: < 7/10  Patient unable to report pain score: N/A    Post-anesthetic transfer of care checklist/protocol to PACU/ICU- (426 and 427)  Upon conclusion of case, patient transferred to which of the following locations: PACU/Non-ICU  Use of transfer checklist/protocol: Yes  Exclusion: Service Performed in Patient Hospital Room (and thus did not require transfer): N/A  Unplanned admission to ICU related to anesthesia service up through end of PACU care- (MD51)  Unplanned admission to ICU (not initially anticipated at anesthesia start time): No

## 2020-10-12 NOTE — PROGRESS NOTES
- pt is a , 40.2 weeks gestation IUP, history  death, scheuled OP gel. No c/o lof, bleeding, uc's or dfm. efm and toco placed, vss. sve performed, 3/50/-2.   - Sylvie NICOLE given report, reviewed fetal monitor strip with RN, received orders to NRFHTs. Discussed poc with pt.  - report given to SENIA Packer

## 2020-10-12 NOTE — L&D DELIVERY NOTE
SPONTANEOUS VAGINAL DELIVERY PRODEDURE NOTE    PATIENT ID:  NAME:  Gloria Smith  MRN:               [id number]  YOB: 2000    On 10/12/2020 at 15:01, this 19 y.o., now 40w3d , GBS Neg female delivered via  under epidural anesthesia a viable male infant weight pending with APGAR scores of 8 and 9 at one and five minutes. There was no nuchal cord and infant was bulb suctioned at delivery. Cord was doubly clamped, cut and infant handed to RN in attendance. An intact placenta was delivered spontaneously at 15:10 with 3 vessel cord. Upon vaginal exam, there was 1st degree perineal laceration which was repaired using 3.0 Chromic in the usual sterile fashion. Estimated blood loss was 300cc. Patient will be transferred to postpartum in stable condition and infant to  nursery.    Silvestre Reece M.D.

## 2020-10-12 NOTE — PROGRESS NOTES
1503  by Dr. Reece, Male apgars 8 & 9, 1510-spontaneous delivery placenta, intact and normal. Cord gases obtained and sent. First degree laceration with repair. .

## 2020-10-13 LAB
ERYTHROCYTE [DISTWIDTH] IN BLOOD BY AUTOMATED COUNT: 50.9 FL (ref 35.9–50)
HCT VFR BLD AUTO: 32.2 % (ref 37–47)
HGB BLD-MCNC: 10.8 G/DL (ref 12–16)
MCH RBC QN AUTO: 34.1 PG (ref 27–33)
MCHC RBC AUTO-ENTMCNC: 33.5 G/DL (ref 33.6–35)
MCV RBC AUTO: 101.6 FL (ref 81.4–97.8)
PLATELET # BLD AUTO: 112 K/UL (ref 164–446)
PMV BLD AUTO: 10.7 FL (ref 9–12.9)
RBC # BLD AUTO: 3.17 M/UL (ref 4.2–5.4)
WBC # BLD AUTO: 13.3 K/UL (ref 4.8–10.8)

## 2020-10-13 PROCEDURE — 700102 HCHG RX REV CODE 250 W/ 637 OVERRIDE(OP): Performed by: NURSE PRACTITIONER

## 2020-10-13 PROCEDURE — 770002 HCHG ROOM/CARE - OB PRIVATE (112)

## 2020-10-13 PROCEDURE — A9270 NON-COVERED ITEM OR SERVICE: HCPCS | Performed by: NURSE PRACTITIONER

## 2020-10-13 PROCEDURE — 85027 COMPLETE CBC AUTOMATED: CPT

## 2020-10-13 PROCEDURE — 36415 COLL VENOUS BLD VENIPUNCTURE: CPT

## 2020-10-13 RX ORDER — IBUPROFEN 800 MG/1
800 TABLET ORAL EVERY 8 HOURS PRN
Qty: 30 TAB | Refills: 0 | Status: SHIPPED | OUTPATIENT
Start: 2020-10-13 | End: 2020-11-12

## 2020-10-13 RX ORDER — DOCUSATE SODIUM 100 MG/1
100 CAPSULE, LIQUID FILLED ORAL 2 TIMES DAILY
Qty: 60 CAP | Refills: 0 | Status: SHIPPED | OUTPATIENT
Start: 2020-10-13 | End: 2020-11-12

## 2020-10-13 RX ORDER — ACETAMINOPHEN 500 MG
1000 TABLET ORAL EVERY 6 HOURS PRN
Qty: 30 TAB | Refills: 0 | Status: SHIPPED | OUTPATIENT
Start: 2020-10-13 | End: 2020-11-12

## 2020-10-13 RX ADMIN — IBUPROFEN 800 MG: 800 TABLET, FILM COATED ORAL at 04:32

## 2020-10-13 RX ADMIN — IBUPROFEN 800 MG: 800 TABLET, FILM COATED ORAL at 16:07

## 2020-10-13 ASSESSMENT — EDINBURGH POSTNATAL DEPRESSION SCALE (EPDS)
I HAVE FELT SAD OR MISERABLE: NO, NOT AT ALL
I HAVE BEEN ANXIOUS OR WORRIED FOR NO GOOD REASON: YES, SOMETIMES
I HAVE LOOKED FORWARD WITH ENJOYMENT TO THINGS: AS MUCH AS I EVER DID
I HAVE BEEN SO UNHAPPY THAT I HAVE BEEN CRYING: ONLY OCCASIONALLY
I HAVE BEEN SO UNHAPPY THAT I HAVE HAD DIFFICULTY SLEEPING: NOT VERY OFTEN
THINGS HAVE BEEN GETTING ON TOP OF ME: NO, MOST OF THE TIME I HAVE COPED QUITE WELL
THE THOUGHT OF HARMING MYSELF HAS OCCURRED TO ME: NEVER
I HAVE BEEN ABLE TO LAUGH AND SEE THE FUNNY SIDE OF THINGS: AS MUCH AS I ALWAYS COULD
I HAVE FELT SCARED OR PANICKY FOR NO GOOD REASON: NO, NOT MUCH
I HAVE BLAMED MYSELF UNNECESSARILY WHEN THINGS WENT WRONG: YES, SOME OF THE TIME

## 2020-10-13 NOTE — CARE PLAN
Problem: Altered physiologic condition related to immediate post-delivery state and potential for bleeding/hemorrhage  Goal: Patient physiologically stable as evidenced by normal lochia, palpable uterine involution and vital signs within normal limits  Outcome: PROGRESSING AS EXPECTED  Note: Fundus firm, lochia is light and rubra. No clots.      Problem: Alteration in comfort related to episiotomy, vaginal repair and/or after birth pains  Goal: Patient is able to ambulate, care for self and infant  Outcome: PROGRESSING AS EXPECTED  Note: Pt ambulating in room and to restroom without difficulty. Gait steady. Involved in infant cares. Pt denies dizziness or lightheadedness.

## 2020-10-13 NOTE — LACTATION NOTE
This note was copied from a baby's chart.  Baby 40.3 weeks, , baby 22 hours old, MOB Hx  death @ 10 days. Parents Belizean speaking used ipad  Andreas #[id number]. Baby swaddled in crib, showing hunger cues. Baby placed STS, positioned in cross cradle hold on right breast, after several attempts baby latched deep with coordinated sucks- see latch assessment score.     Teaching on hunger cues, breastfeeding when baby shows cues or by 4 hours from last feed, importance of skin to skin, positioning baby at breast nipple to nose & cluster feeding is normal.     [place]an Breastfeeding book provided, Mother does not have WIC. MOB states Mother-in-law can help her with breastfeeding.     Breastfeeding plan:  Breastfeed on cue a minimum 8x/24 hours or by 4 hours from last feed.

## 2020-10-13 NOTE — DISCHARGE SUMMARY
Patient held until 10/14 for discharge due to peds wanting to keep baby.  Below note has been addended/modified appropriately.  Discharge Summary:      Gloria Smith    Admit Date:   10/11/2020  Discharge Date:  10/1/2020     Admitting diagnosis:  Pregnancy  Indication for care in labor or delivery  Discharge Diagnosis: Status post vaginal, spontaneous.  Pregnancy Complications: none  Tubal Ligation:  no        History:  History reviewed. No pertinent past medical history.  OB History    Para Term  AB Living   2 2 2     1   SAB TAB Ectopic Molar Multiple Live Births           0 2      # Outcome Date GA Lbr Suresh/2nd Weight Sex Delivery Anes PTL Lv   2 Term 10/12/20 40w3d  3.995 kg (8 lb 12.9 oz) M Vag-Spont Spinal N ALCIRA   1 Term 18 40w0d   M Vag-Spont EPI N DEC      Birth Comments:  at 10 days old; NEC?        Patient has no known allergies.  Patient Active Problem List    Diagnosis Date Noted   • Supervision of other normal pregnancy, antepartum 2020   • History of  death 2020   • Papanicolaou smear of cervix with low grade squamous intraepithelial lesion (LGSIL) 2020        Hospital Course:   19 y.o. , now para 2, was admitted with the above mentioned diagnosis, underwent Induction of Labor, vaginal, spontaneous. Patient postpartum course was unremarkable, with progressive advancement in diet , ambulation and toleration of oral analgesia. Patient without complaints today and desires discharge.      Vitals:    10/13/20 0200 10/13/20 0600 10/13/20 1800 10/14/20 0542   BP: 111/65 119/72 120/67 118/69   Pulse: 94 99 96 82   Resp: 18 18 18 19   Temp: 36.9 °C (98.4 °F) 36.6 °C (97.8 °F) 36.8 °C (98.3 °F) 36.4 °C (97.6 °F)   TempSrc: Temporal Temporal Temporal Temporal   SpO2: 96% 96% 96% 98%   Weight:       Height:           Current Facility-Administered Medications   Medication Dose   • LR infusion     • tetanus-dipth-acell pertussis (Tdap) inj  0.5 mL  0.5 mL   • measles, mumps and rubella vaccine (MMR) injection 0.5 mL  0.5 mL   • PRN oxytocin (PITOCIN) (20 Units/1000 mL) PRN for excessive uterine bleeding - See Admin Instr  125-999 mL/hr   • miSOPROStol (CYTOTEC) tablet 600 mcg  600 mcg   • methylergonovine (METHERGINE) injection 0.2 mg  0.2 mg   • carboPROST (HEMABATE) injection 250 mcg  250 mcg   • docusate sodium (COLACE) capsule 100 mg  100 mg   • bisacodyl (DULCOLAX) suppository 10 mg  10 mg   • magnesium hydroxide (MILK OF MAGNESIA) suspension 30 mL  30 mL   • metoclopramide (REGLAN) injection 10 mg  10 mg   • oxytocin (PITOCIN) infusion (for induction)  0.5-20 melia-units/min   • oxytocin (PITOCIN) infusion (for postpartum)   mL/hr   • ibuprofen (MOTRIN) tablet 800 mg  800 mg   • acetaminophen (TYLENOL) tablet 650 mg  650 mg       Exam:  CV: RRR. No murmer.   Lungs: CTAB  Abdomen: Abdomen soft, non-tender. BS normal. No masses, fundus below umbilicus  Fundus Non Tender: no  Extremity: No LE edema or redness      Labs:  Recent Labs     10/11/20  2220 10/13/20  0152   WBC 10.2 13.3*   RBC 4.28 3.17*   HEMOGLOBIN 14.2 10.8*   HEMATOCRIT 44.4 32.2*   .7* 101.6*   MCH 33.2* 34.1*   MCHC 32.0* 33.5*   RDW 51.5* 50.9*   PLATELETCT 141* 112*   MPV 10.8 10.7        Activity:   Discharge to home  Pelvic Rest x 6 weeks    Assessment:  normal postpartum course  Discharge Assessment:      Follow up: [facility] or [facility] in 5 weeks for vaginal    Discharge Meds:   Current Outpatient Medications   Medication Sig Dispense Refill   • ibuprofen (MOTRIN) 800 MG Tab Take 1 Tab by mouth every 8 hours as needed for up to 30 days. 30 Tab 0   • acetaminophen (TYLENOL) 500 MG Tab Take 2 Tabs by mouth every 6 hours as needed for up to 30 days. 30 Tab 0   • docusate sodium (COLACE) 100 MG Cap Take 1 Cap by mouth 2 times a day for 30 days. 60 Cap 0       Larissa Watters D.O.

## 2020-10-13 NOTE — DISCHARGE PLANNING
Discharge Planning Assessment Post Partum    Reason for Referral: History of  death at 10 days with prior child.  Address: [address] [place] [address] [place] [address]  Phone: [phone]  Type of Living Situation: living with FOB   Mom Diagnosis: Pregnancy  Baby Diagnosis: [place]-40.3 weeks  Primary Language: Japanese speaking, the  iPad was used- #[id number] (K[place]) was used.    Name of Baby: [place]   Father of the Baby is involved    Prenatal Care: Yes  Mom's PCP: None  PCP for new baby: Pediatrician list provided    Support System: FOB  Coping/Bonding between mother & baby: Yes  Source of Feeding: breast feeding  Supplies for Infant: prepared for infant; denies any needs    Mom's Insurance: Access to Healthcare  Baby Covered on Insurance:Yes  Mother Employed/School: Not currently  Other children in the home/names & ages: parents lost a baby boy on 18 after 10 days due to NEC per medical record    Financial Hardship/Income: denies   Mom's Mental status: alert and oriented  Services used prior to admit: No    CPS History: No  Psychiatric History: No  Domestic Violence History: No  Drug/ETOH History: No    Resources Provided: pediatrician list, children and family resource list, and post partum support and counseling resources provided  Referrals Made: diaper bank referral provided     Clearance for Discharge: Infant is cleared to discharge home with parents.

## 2020-10-13 NOTE — PROGRESS NOTES
Patient brought from labor and delivery via wheelchair.  Patient oriented to room and surroundings. Id bands and security issues discussed. Including not letting anyone take infant without pink photo id. No sleeping with infant, no carrying infant in halls, and no leaving infant unattended. 0-10 pain scale and pain management discussed. Fundus firm with light lochia. IV infusing without redness or swelling.  Family at bedside.  Patient encouraged to call before getting out of bed until stable.  Patient encouraged to call for any needs.

## 2020-10-13 NOTE — PROGRESS NOTES
1950  Assumed care of patient. Assessment complete. Fundus is firm, but was deviated to the left, with light lochia rubra. Per pt, she has not voided since last void on L&D. Assisted patient with the help of a student up to the bathroom. Pt voided, gait was steady. Pt back to bed. Fundus firm, and midlined. Lochia light and rubra. No clots. Pain level is 4/10, denies pain medication at this time. Assisted MOB with latching infant. Infant at this time, a bit spitty and was not interested in feeding. No feeding cues. Sleepy. Education given to MOB on first 24 hour behavior of infants. Mother verbalized understanding. Demonstrated how to hand express and feed infant drops of colostrum by putting them on lips and mouth. MOB verbalized understanding, encouraged to call for next feeding. Bed is locked and in low position. Call light left within reach and encouraged to call for any needs if necessary.     2220  MOB still doing skin to skin since about 2145. Per pt she has attempted to latch however infant remained asleep. This RN undressed infant down into just a diaper and placed skin to skin with MOB. Infant more alert at this time. Nose to mouth demonstration given. Infant opening up more, as infant opened wide this RN was able to latch infant to breast in cross cradle position. Infant had rhythmic sucking, no spit up episodes at this time. Minimal pain with latch, mob states theres some soreness. Blankets placed over MOB and infant.

## 2020-10-14 VITALS
BODY MASS INDEX: 33.04 KG/M2 | HEIGHT: 61 IN | DIASTOLIC BLOOD PRESSURE: 69 MMHG | WEIGHT: 175 LBS | TEMPERATURE: 97.6 F | SYSTOLIC BLOOD PRESSURE: 118 MMHG | HEART RATE: 82 BPM | OXYGEN SATURATION: 98 % | RESPIRATION RATE: 19 BRPM

## 2020-10-14 PROCEDURE — A9270 NON-COVERED ITEM OR SERVICE: HCPCS | Performed by: NURSE PRACTITIONER

## 2020-10-14 PROCEDURE — 700102 HCHG RX REV CODE 250 W/ 637 OVERRIDE(OP): Performed by: NURSE PRACTITIONER

## 2020-10-14 RX ADMIN — IBUPROFEN 800 MG: 800 TABLET, FILM COATED ORAL at 04:09

## 2020-10-14 ASSESSMENT — EDINBURGH POSTNATAL DEPRESSION SCALE (EPDS)
THINGS HAVE BEEN GETTING ON TOP OF ME: NO, I HAVE BEEN COPING AS WELL AS EVER
I HAVE BLAMED MYSELF UNNECESSARILY WHEN THINGS WENT WRONG: NO, NEVER
I HAVE FELT SCARED OR PANICKY FOR NO GOOD REASON: NO, NOT AT ALL
I HAVE BEEN SO UNHAPPY THAT I HAVE HAD DIFFICULTY SLEEPING: NOT AT ALL
I HAVE BEEN ABLE TO LAUGH AND SEE THE FUNNY SIDE OF THINGS: AS MUCH AS I ALWAYS COULD
I HAVE BEEN SO UNHAPPY THAT I HAVE BEEN CRYING: NO, NEVER
I HAVE FELT SAD OR MISERABLE: NO, NOT AT ALL
I HAVE BEEN ANXIOUS OR WORRIED FOR NO GOOD REASON: NO, NOT AT ALL
THE THOUGHT OF HARMING MYSELF HAS OCCURRED TO ME: NEVER
I HAVE LOOKED FORWARD WITH ENJOYMENT TO THINGS: AS MUCH AS I EVER DID

## 2020-10-14 ASSESSMENT — PAIN DESCRIPTION - PAIN TYPE: TYPE: ACUTE PAIN

## 2020-10-14 NOTE — PROGRESS NOTES
2000 Assessment completed, fundus firm, lochia light, POC reviewed, verbalized understanding. Denies pain at this time, will call if pain med intervention needed.     0400 MOB complained of bilateral nipple pain 7/10. RN recommended MOB to rest her nipples and pump for a few feedings.  Breast pump settings were shown, iPad  was in use, MOB had no further questions.

## 2020-10-14 NOTE — PROGRESS NOTES
Discharge instructions given to pt via d/c RNSoco. Discharged home in stable condition with all personal belongings.

## 2020-10-14 NOTE — DISCHARGE INSTRUCTIONS
POSTPARTUM DISCHARGE INSTRUCTIONS FOR MOM    YOB: 2000   Age: 19 y.o.               Admit Date: 10/11/2020     Discharge Date: 10/14/2020  Attending Doctor:  Federico Jefferson M.D.                  Allergies:  Patient has no known allergies.    Discharged to home by car. Discharged via wheelchair, hospital escort: Yes.  Special equipment needed: Not Applicable  Belongings with: Personal  Be sure to schedule a follow-up appointment with your primary care doctor or any specialists as instructed.     Discharge Plan:   Diet Plan: Discussed  Activity Level: Discussed  Confirmed Follow up Appointment: Patient to Call and Schedule Appointment  Confirmed Symptoms Management: Discussed  Medication Reconciliation Updated: Yes  Influenza Vaccine Indication: Not indicated: Previously immunized this influenza season and > 8 years of age    REASONS TO CALL YOUR OBSTETRICIAN:  1.   Persistent fever or shaking chills (Temperature higher than 100.4)  2.   Heavy bleeding (soaking more than 1 pad per hour); Passing clots  3.   Foul odor from vagina  4.   Mastitis (Breast infection; breast pain, chills, fever, redness)  5.   Urinary pain, burning or frequency  6.   Severe depression longer than 24 hours    HAND WASHING  · Prior to handling the baby.  · Before breastfeeding or bottle feeding baby.  · After using the bathroom or changing the baby's diaper.      VAGINAL CARE  · Nothing inside vagina for 6 weeks: no sexual intercourse, tampons or douching.  · Bleeding may continue for 2-4 weeks.  Amount may vary.    · Call your physician for heavy bleeding which means soaking more than 1 pad per hour    BIRTH CONTROL  · It is possible to become pregnant at any time after delivery and while breastfeeding.  · Plan to discuss a method of birth control with your physician at your follow up visit. visit.    DIET AND ELIMINATION  · Eating more fiber (bran cereal, fruits, and vegetables) and drinking plenty of fluids will help to avoid  "constipation.  · Urinary frequency after childbirth is normal.    POSTPARTUM BLUES  During the first few days after birth, you may experience a sense of the \"blues\" which may include impatience, irritability or even crying.  These feeling come and go quickly.  However, as many as 1 in 10 women experience emotional symptoms known as postpartum depression.    Postpartum depression:  May start as early as the second or third day after delivery or take several weeks or months to develop.  Symptoms of \"blues\" are present, but are more intense:  Crying spells; loss of appetite; feelings of hopelessness or loss of control; fear of touching the baby; over concern or no concern at all about the baby; little or no concern about your own appearance/caring for yourself; and/or inability to sleep or excessive sleeping.  Contact your physician if you are experiencing any of these symptoms.    Crisis Hotline:  · [place] Crisis Hotline:  7-384-PXRATJJ  Or 1-813.652.6284  · [place] Crisis Hotline:  1-902.447.7592  Or 955-000-0612    PREVENTING SHAKEN BABY:  If you are angry or stressed, PUT THE BABY IN THE CRIB, step away, take some deep breaths, and wait until you are calm to care for the baby.  DO NOT SHAKE THE BABY.  You are not alone, call a supporter for help.    · Crisis Call Center 24/7 crisis line 902-237-1983 or 1-776.721.5170  · You can also text them, text \"ANSWER\" to 928077    QUIT SMOKING/TOBACCO USE:  I understand the use of any tobacco products increases my chance of suffering from future heart disease and could cause other illnesses which may shorten my life. Quitting the use of tobacco products is the single most important thing I can do to improve my health. For further information on smoking / tobacco cessation call a Toll Free Quit Line at 1-531.976.5376 (*[employer] [place]) or 1-436.247.7760 (American Lung Association) or you can access the web based program at www.lungusa.org.    · [place] Tobacco Users " Help Line:  (195) 551-4048       Toll Free: 1-605.563.9506  · Quit Tobacco Program [facility] (477)526-5374    DEPRESSION / SUICIDE RISK:  As you are discharged from this [facility], it is important to learn how to keep safe from harming yourself.    Recognize the warning signs:  · Abrupt changes in personality, positive or negative- including increase in energy   · Giving away possessions  · Change in eating patterns- significant weight changes-  positive or negative  · Change in sleeping patterns- unable to sleep or sleeping all the time   · Unwillingness or inability to communicate  · Depression  · Unusual sadness, discouragement and loneliness  · Talk of wanting to die  · Neglect of personal appearance   · Rebelliousness- reckless behavior  · Withdrawal from people/activities they love  · Confusion- inability to concentrate     If you or a loved one observes any of these behaviors or has concerns about self-harm, here's what you can do:  · Talk about it- your feelings and reasons for harming yourself  · Remove any means that you might use to hurt yourself (examples: pills, rope, extension cords, firearm)  · Get professional help from the community (Mental Health, Substance Abuse, psychological counseling)  · Do not be alone:Call your Safe Contact- someone whom you trust who will be there for you.  · Call your local CRISIS HOTLINE 124-0538 or 602-965-1538  · Call your local Children's Mobile Crisis Response Team [place] (401) 051-3156 or www.Orexo  · Call the toll free National Suicide Prevention Hotlines   · National Suicide Prevention Lifeline 866-887-LBAR (6775)  · National Hope Line Network 800-SUICIDE (423-7732)    DISCHARGE SURVEY:  Thank you for choosing [facility].  We hope we provided you with very good care.  You may be receiving a survey in the mail.  Please fill it out.  Your opinion is valuable to us.        My signature on this form indicates  that:  1.  I have reviewed and understand the above information  2.  My questions regarding this information have been answered to my satisfaction.  3.  I have formulated a plan with my discharge nurse to obtain my prescribed medication for home.

## 2020-10-14 NOTE — LACTATION NOTE
This note was copied from a baby's chart.  Mother Turkish speaking used ipad   Srinivasa # [id number]. Follow-up visit, baby cluster fed last night, MOB c/o nipples sore- nipples scabbed bilaterally. Positioned mother in chair for better positioning, baby STS, assisted to right breast using cross cradle hold, obtained deep latch- see latch assessment score.     Mother desires WIC, information sheet given, encouraged mother to apply for WIC. Reinforced teaching on positioning baby at breast nipple to nose & waiting for baby to open wide for deep latch, breastfeeding a minimum 8x/24 hours or by 4 hours from last feed.     Breastfeeding plan:  Breastfeed on cue a minimum 8x/24 hours or by 4 hours from last feed.

## 2021-04-28 ENCOUNTER — IMMUNIZATION (OUTPATIENT)
Dept: FAMILY PLANNING/WOMEN'S HEALTH CLINIC | Facility: IMMUNIZATION CENTER | Age: 21
End: 2021-04-28
Payer: COMMERCIAL

## 2021-04-28 DIAGNOSIS — Z23 ENCOUNTER FOR VACCINATION: Primary | ICD-10-CM

## 2021-04-28 PROCEDURE — 91300 PFIZER SARS-COV-2 VACCINE: CPT

## 2021-04-28 PROCEDURE — 0001A PFIZER SARS-COV-2 VACCINE: CPT

## 2021-05-21 ENCOUNTER — IMMUNIZATION (OUTPATIENT)
Dept: FAMILY PLANNING/WOMEN'S HEALTH CLINIC | Facility: IMMUNIZATION CENTER | Age: 21
End: 2021-05-21
Payer: COMMERCIAL

## 2021-05-21 DIAGNOSIS — Z23 ENCOUNTER FOR VACCINATION: Primary | ICD-10-CM

## 2021-05-21 PROCEDURE — 0002A PFIZER SARS-COV-2 VACCINE: CPT

## 2021-05-21 PROCEDURE — 91300 PFIZER SARS-COV-2 VACCINE: CPT
